# Patient Record
Sex: FEMALE | Race: WHITE | NOT HISPANIC OR LATINO | Employment: FULL TIME | URBAN - METROPOLITAN AREA
[De-identification: names, ages, dates, MRNs, and addresses within clinical notes are randomized per-mention and may not be internally consistent; named-entity substitution may affect disease eponyms.]

---

## 2020-02-27 ENCOUNTER — ULTRASOUND (OUTPATIENT)
Dept: OBGYN CLINIC | Facility: CLINIC | Age: 30
End: 2020-02-27
Payer: COMMERCIAL

## 2020-02-27 VITALS
SYSTOLIC BLOOD PRESSURE: 102 MMHG | BODY MASS INDEX: 34.45 KG/M2 | HEIGHT: 64 IN | WEIGHT: 201.8 LBS | DIASTOLIC BLOOD PRESSURE: 74 MMHG

## 2020-02-27 DIAGNOSIS — N91.2 AMENORRHEA: ICD-10-CM

## 2020-02-27 DIAGNOSIS — Z32.01 POSITIVE PREGNANCY TEST: ICD-10-CM

## 2020-02-27 LAB — SL AMB POCT URINE HCG: POSITIVE

## 2020-02-27 PROCEDURE — 76817 TRANSVAGINAL US OBSTETRIC: CPT | Performed by: NURSE PRACTITIONER

## 2020-02-27 PROCEDURE — 81025 URINE PREGNANCY TEST: CPT | Performed by: NURSE PRACTITIONER

## 2020-02-27 NOTE — PROGRESS NOTES
Technician: Study performed by the interpreting Certified Nurse Practitioner    Indications:  amenorrhea       Prior to use, disinfection was performed with [employer] Disinfection Process  Probe Serial Number#                                          [id number] [facility])      LMP 2019   (8 weeks 2 days gestational age based on dates)             Procedure Details  A gestational sac is seen containing a yolk sac and a sarah embryo  The embryonic crown-rump length measures  1 70 cm  and calculates to an estimated gestational age of 11 weeks and 1 Days  Embryonic cardiac activity is present @ 140 BPM     Findings:   Viable, sarah intrauterine pregnancy at 8 weeks,  2 days, (no change to MARCIAL)  with a calculated MARCIAL of 10/6/2020     Plan to RTO for OB Intake

## 2020-02-27 NOTE — PATIENT INSTRUCTIONS
First Trimester Pregnancy   WHAT YOU NEED TO KNOW:   The first trimester of pregnancy lasts from your last period through the 13th week of pregnancy  Follow up with your healthcare provider for prenatal care  Regular prenatal care can help to keep you and your baby healthy  DISCHARGE INSTRUCTIONS:   Return to the emergency department if:   · You have pain or cramping in your abdomen or low back  · You have severe vaginal bleeding or clotting  Contact your healthcare provider or obstetrician if:   · You have light bleeding  · You have chills or a fever  · You have vaginal itching, burning, or pain  · You have yellow, green, white, or foul-smelling vaginal discharge  · You have pain or burning when you urinate, less urine than usual, or pink or bloody urine  · You have questions or concerns about your condition or care  Follow up with your healthcare provider or obstetrician as directed:  Go to all of your prenatal visits during your pregnancy  Write down your questions so you remember to ask them during your visits  Stay healthy during pregnancy:   · Take prenatal vitamins as directed  Prenatal vitamins can help you get the amount of vitamins and minerals you need during pregnancy  Prenatal vitamins may also decrease the risk of certain birth defects  · Eat a variety of healthy foods  Healthy foods include fruits, vegetables, whole-grain breads, low-fat dairy products, beans, turkey and chicken, and lean red meat  Ask your healthcare provider for more information about foods that are healthy and safe to eat during pregnancy  · Drink liquids as directed  Ask how much liquid to drink each day and which liquids are best for you  Some healthy liquids include milk, water, and juice  It is not clear how caffeine affects pregnancy  Limit your intake of caffeine to less than 200 mg each day to avoid possible health problems   Caffeine may be found in coffee, tea, cola, sports drinks, and chocolate  Do not drink alcohol  · Talk to your healthcare provider before you take medicines  Many medicines can harm your baby, especially during early pregnancy  Ask your healthcare provider before you take any medicines, including over-the-counter medicines, vitamins, herbs, or food supplements  Never use illegal or street drugs while you are pregnant  Talk to your healthcare provider if you are having trouble quitting street drugs  · Exercise  Ask your healthcare provider about the best exercise plan for you  Exercise may help you feel better and make your labor and delivery easier  · Do not smoke  Smoking can cause problems during pregnancy  It can also cause your baby to weigh less at birth  If you smoke, it is never too late to quit  Ask for information if you need help quitting  Safety tips:   · Do not use a hot tub or sauna  while you are pregnant, especially during your first trimester  Hot tubs and saunas may raise your baby's temperature and increase the risk of birth defects  It also increases your risk of miscarriage  · Protect yourself from illness  Toxoplasmosis is a disease that can cause birth defects  To protect yourself from this disease, do not clean your cat's litter box yourself  Ask someone else to clean your cat's litter box while you are pregnant  Also, do not  eat raw meat or unwashed fruits and vegetables  Wash your hands after you touch raw meat, and eat only well-cooked meat  Wash fruits and vegetables well before you eat them  © 2017 [address] Information is for End User's use only and may not be sold, redistributed or otherwise used for commercial purposes  All illustrations and images included in CareNotes® are the copyrighted property of [employer] M [employer]  or [employer]onel  The above information is an  only  It is not intended as medical advice for individual conditions or treatments   Talk to your doctor, nurse or pharmacist before following any medical regimen to see if it is safe and effective for you

## 2020-03-12 ENCOUNTER — INITIAL PRENATAL (OUTPATIENT)
Dept: OBGYN CLINIC | Facility: CLINIC | Age: 30
End: 2020-03-12

## 2020-03-12 VITALS
WEIGHT: 200 LBS | SYSTOLIC BLOOD PRESSURE: 106 MMHG | HEART RATE: 82 BPM | BODY MASS INDEX: 34.15 KG/M2 | HEIGHT: 64 IN | DIASTOLIC BLOOD PRESSURE: 70 MMHG

## 2020-03-12 DIAGNOSIS — Z34.01 ENCOUNTER FOR SUPERVISION OF NORMAL FIRST PREGNANCY IN FIRST TRIMESTER: Primary | ICD-10-CM

## 2020-03-12 PROCEDURE — OBC: Performed by: STUDENT IN AN ORGANIZED HEALTH CARE EDUCATION/TRAINING PROGRAM

## 2020-03-12 RX ORDER — METOCLOPRAMIDE 10 MG/1
10 TABLET ORAL 4 TIMES DAILY
COMMUNITY
Start: 2020-03-10 | End: 2020-03-15

## 2020-03-12 NOTE — PROGRESS NOTES
OB INTAKE INTERVIEW  * Pt presents for OB intake 10w2d  * Accompanied by: self  *  *Hx of  delivery prior to 36 weeks 6 days no  *Last Menstrual Period: Pt's LMP was 19  *Ultrasound date:20   8weeks 1days  *Estimated date of delivery: 10/6/20   * confirmed by LMP    *Signs/Symptoms of Pregnancy   *nausea most day, vomiting some days, fatigue    *constipation no   *headaches no   *cramping/spotting no   *PICA cravings no  *Diabetes- if you answer yes, please order 1 hour GTT, 50g   *hx of GDM no   *BMI >35 no   *first degree relative with type 2 diabetes no   *hx of PCOS no   *current metformin use no   *prior hx of LGA/macrosomia no   *AMA with other risk factors no  *Hypertension- if you answer yes, please order preeclampsia labs including 24 hour urine protein   *Hx of chronic HTN no   *hx of gestational HTN no   *hx of preeclampsia, eclampsia, or HELLP syndrome no  *Infection Screening-    *does the pt have a hx of MRSA? no   *if yes- please follow MRSA protocol and obtain a nasal swab for MRSA culture   *history of herpes? no   *ok for blood transfusion YES  *Immunizations:   *influenza vaccine given today no-declines    *discussed Tdap vaccine-yes     *Interview education   *[facility] Pregnancy Essentials Book reviewed and discussed   *Handouts given:    *Baby and Me phone melvin guide    *Baby and Me support center    *  [facility]     *discussed genetic testing- pt interested YES     *appointment at [place] [id number] made YES    *Prenatal lab work scripts advised to complete by 3/21   *Nurse/Family Partnership- pt may qualify YES; referral placed no, info given and she understands that she must enroll by 28wk  *4 P's- substance abuse screening    Presently using? no    Past use? no    Partner using? no    Parents/Family using? no    *I have these concerns about this prenatal patient: Unplanned but welcomed pregnancy  This will be her and her FOB first child   Overall she feels well, nausea/acid reflux are her concerns  She has a known hiatal hernia, we discussed daily medication options  We also discussed healthy food/fluid options and weight gain during pregnancy with a starting BMI of 34 4  *Details that I feel the provider should be aware of:     PN1 visit scheduled  The patient was oriented to our practice and all questions were answered   PETER Marcelo by: Jami White RN

## 2020-03-12 NOTE — PATIENT INSTRUCTIONS
Pregnancy   AMBULATORY CARE:   What you need to know about pregnancy:  A normal pregnancy lasts about 40 weeks  The first trimester lasts from your last period through the 12th week of pregnancy  The second trimester lasts from the 13th week of your pregnancy through the 23rd week  The third trimester lasts from your 24th week of pregnancy until your baby is born  If you know the date of your last period, your healthcare provider can estimate your due date  You may give birth to your baby any time from 37 weeks to 2 weeks after your due date  Seek care immediately if:   · You develop a severe headache that does not go away  · You have new or increased vision changes, such as blurred or spotted vision  · You have new or increased swelling in your face or hands  · You have pain or cramping in your abdomen or low back  · You have vaginal bleeding  Contact your healthcare provider or obstetrician if:   · You have abdominal cramps, pressure, or tightening  · You have a change in vaginal discharge  · You cannot keep food or drinks down, and you are losing weight  · You have chills or a fever  · You have vaginal itching, burning, or pain  · You have yellow, green, white, or foul-smelling vaginal discharge  · You have pain or burning when you urinate, less urine than usual, or pink or bloody urine  · You have questions or concerns about your condition or care  Body changes that may occur during your pregnancy:   · Breast changes  you will experience include tenderness and tingling during the early part of your pregnancy  Your breasts will become larger  You may need to use a support bra  You may see a thin, yellow fluid, called colostrum, leak from your nipples during the second trimester  Colostrum is a liquid that changes to milk about 3 days after you give birth  · Skin changes and stretch marks  may occur during your pregnancy   You may have red marks, called stretch marks, on your skin  Stretch marks will usually fade after pregnancy  Use lotion if your skin is dry and itchy  The skin on your face, around your nipples, and below your belly button may darken  Most of the time, your skin will return to its normal color after your baby is born  · Morning sickness  is nausea and vomiting that can happen at any time of day  Avoid fatty and spicy foods  Eat small meals throughout the day instead of large meals  Otilia may help to decrease nausea  Ask your healthcare provider about other ways of decreasing nausea and vomiting  · Heartburn  may be caused by changes in your hormones during pregnancy  Your growing uterus may also push your stomach upward and force stomach acid to back up into your esophagus  Eat 4 or 5 small meals each day instead of large meals  Avoid spicy foods  Avoid eating right before bedtime  · Constipation  may develop during your pregnancy  To treat constipation, eat foods high in fiber such as fiber cereals, beans, fruits, vegetables, whole-grain breads, and prune juice  Get regular exercise and drink plenty of water  Your healthcare provider may also suggest a fiber supplement to soften your bowel movements  Talk to your healthcare provider before you use any medicines to decrease constipation  · Hemorrhoids  are enlarged veins in the rectal area  They may cause pain, itching, and bright red bleeding from your rectum  To decrease your risk of hemorrhoids, prevent constipation and do not strain to have a bowel movement  If you have hemorrhoids, soak in a tub of warm water to ease discomfort  Ask your healthcare provider how you can treat hemorrhoids  · Leg cramps and swelling  may be caused by low calcium levels or the added weight of pregnancy  Raise your legs above the level of your heart to decrease swelling  During a leg cramp, stretch or massage the muscle that has the cramp  Heat may help decrease pain and muscle spasms   Apply heat on your muscle for 20 to 30 minutes every 2 hours for as many days as directed  · Back pain  may occur as your baby grows  Do not stand for long periods of time or lift heavy items  Use good posture while you stand, squat, or bend  Wear low-heeled shoes with good support  Rest may also help to relieve back pain  Ask your healthcare provider about exercises you can do to strengthen your back muscles  Stay healthy during your pregnancy:   · Eat a variety of healthy foods  Healthy foods include fruits, vegetables, whole-grain breads, low-fat dairy foods, beans, lean meats, and fish  Drink liquids as directed  Ask how much liquid to drink each day and which liquids are best for you  Limit caffeine to less than 200 milligrams each day  Limit your intake of fish to 2 servings each week  Choose fish low in mercury such as canned light tuna, shrimp, crab, salmon, cod, or tilapia  Do not  eat fish high in mercury such as swordfish, tilefish, richie mackerel, and shark  · Take prenatal vitamins as directed  Your need for certain vitamins and minerals, such as folic acid, increases during pregnancy  Prenatal vitamins provide some of the extra vitamins and minerals you need  Prenatal vitamins may also help to decrease the risk of certain birth defects  · Ask how much weight you should gain during your pregnancy  Too much or too little weight gain can be unhealthy for you and your baby  · Talk to your healthcare provider about exercise  Moderate exercise can help you stay fit  Your healthcare provider will help you plan an exercise program that is safe for you during pregnancy  · Do not smoke  If you smoke, it is never too late to quit  Smoking increases your risk of a miscarriage and other health problems during your pregnancy  Smoking can cause your baby to be born too early or weigh less at birth  Ask your healthcare provider for information if you need help quitting  · Do not drink alcohol    Alcohol passes from your body to your baby through the placenta  It can affect your baby's brain development and cause fetal alcohol syndrome (FAS)  FAS is a group of conditions that causes mental, behavior, and growth problems  · Talk to your healthcare provider before you take any medicines  Many medicines may harm your baby if you take them when you are pregnant  Do not take any medicines, vitamins, herbs, or supplements without first talking to your healthcare provider  Never use illegal or street drugs (such as marijuana or cocaine) while you are pregnant  Safety tips:   · Avoid hot tubs and saunas  Do not use a hot tub or sauna while you are pregnant, especially during your first trimester  Hot tubs and saunas may raise your baby's temperature and increase the risk of birth defects  · Avoid toxoplasmosis  This is an infection caused by eating raw meat or being around infected cat feces  It can cause birth defects, miscarriages, and other problems  Wash your hands after you touch raw meat  Make sure any meat is well-cooked before you eat it  Avoid raw eggs and unpasteurized milk  Use gloves or ask someone else to clean your cat's litter box while you are pregnant  · Ask your healthcare provider about travel  The most comfortable time to travel is during the second trimester  Ask your healthcare provider if you can travel after 36 weeks  You may not be able to travel in an airplane after 36 weeks  He may also recommend that you avoid long road trips  Follow up with your healthcare provider or obstetrician as directed:  Go to all of your prenatal visits during your pregnancy  Write down your questions so you remember to ask them during your visits  © 2017 [employer] [employer] Information is for End User's use only and may not be sold, redistributed or otherwise used for commercial purposes   All illustrations and images included in CareNotes® are the copyrighted property of [employer] M [employer] Inc  or [employer]  The above information is an  only  It is not intended as medical advice for individual conditions or treatments  Talk to your doctor, nurse or pharmacist before following any medical regimen to see if it is safe and effective for you

## 2020-03-19 ENCOUNTER — APPOINTMENT (OUTPATIENT)
Dept: LAB | Facility: HOSPITAL | Age: 30
End: 2020-03-19
Attending: STUDENT IN AN ORGANIZED HEALTH CARE EDUCATION/TRAINING PROGRAM
Payer: COMMERCIAL

## 2020-03-19 ENCOUNTER — TRANSCRIBE ORDERS (OUTPATIENT)
Dept: ADMINISTRATIVE | Facility: HOSPITAL | Age: 30
End: 2020-03-19

## 2020-03-19 DIAGNOSIS — Z34.01 ENCOUNTER FOR SUPERVISION OF NORMAL FIRST PREGNANCY IN FIRST TRIMESTER: ICD-10-CM

## 2020-03-19 DIAGNOSIS — Z34.00 PRIMIGRAVIDA, ANTEPARTUM: ICD-10-CM

## 2020-03-19 DIAGNOSIS — Z34.00 PRIMIGRAVIDA, ANTEPARTUM: Primary | ICD-10-CM

## 2020-03-19 LAB
ABO GROUP BLD: NORMAL
BACTERIA UR QL AUTO: ABNORMAL /HPF
BASOPHILS # BLD AUTO: 0.03 THOUSANDS/ΜL (ref 0–0.1)
BASOPHILS NFR BLD AUTO: 0 % (ref 0–1)
BILIRUB UR QL STRIP: NEGATIVE
BLD GP AB SCN SERPL QL: NEGATIVE
CLARITY UR: ABNORMAL
COLOR UR: YELLOW
EOSINOPHIL # BLD AUTO: 0.07 THOUSAND/ΜL (ref 0–0.61)
EOSINOPHIL NFR BLD AUTO: 1 % (ref 0–6)
ERYTHROCYTE [DISTWIDTH] IN BLOOD BY AUTOMATED COUNT: 13.9 % (ref 11.6–15.1)
GLUCOSE UR STRIP-MCNC: NEGATIVE MG/DL
HBV SURFACE AG SER QL: NORMAL
HCT VFR BLD AUTO: 40.7 % (ref 34.8–46.1)
HGB BLD-MCNC: 13.2 G/DL (ref 11.5–15.4)
HGB UR QL STRIP.AUTO: NEGATIVE
IMM GRANULOCYTES # BLD AUTO: 0.04 THOUSAND/UL (ref 0–0.2)
IMM GRANULOCYTES NFR BLD AUTO: 0 % (ref 0–2)
KETONES UR STRIP-MCNC: NEGATIVE MG/DL
LEUKOCYTE ESTERASE UR QL STRIP: ABNORMAL
LYMPHOCYTES # BLD AUTO: 2.71 THOUSANDS/ΜL (ref 0.6–4.47)
LYMPHOCYTES NFR BLD AUTO: 28 % (ref 14–44)
MCH RBC QN AUTO: 27.9 PG (ref 26.8–34.3)
MCHC RBC AUTO-ENTMCNC: 32.4 G/DL (ref 31.4–37.4)
MCV RBC AUTO: 86 FL (ref 82–98)
MONOCYTES # BLD AUTO: 0.82 THOUSAND/ΜL (ref 0.17–1.22)
MONOCYTES NFR BLD AUTO: 9 % (ref 4–12)
NEUTROPHILS # BLD AUTO: 5.97 THOUSANDS/ΜL (ref 1.85–7.62)
NEUTS SEG NFR BLD AUTO: 62 % (ref 43–75)
NITRITE UR QL STRIP: NEGATIVE
NON-SQ EPI CELLS URNS QL MICRO: ABNORMAL /HPF
NRBC BLD AUTO-RTO: 0 /100 WBCS
PH UR STRIP.AUTO: 7.5 [PH]
PLATELET # BLD AUTO: 284 THOUSANDS/UL (ref 149–390)
PMV BLD AUTO: 10.5 FL (ref 8.9–12.7)
PROT UR STRIP-MCNC: NEGATIVE MG/DL
RBC # BLD AUTO: 4.73 MILLION/UL (ref 3.81–5.12)
RBC #/AREA URNS AUTO: ABNORMAL /HPF
RH BLD: POSITIVE
RUBV IGG SERPL IA-ACNC: 141.9 IU/ML
SP GR UR STRIP.AUTO: 1.01 (ref 1–1.03)
SPECIMEN EXPIRATION DATE: NORMAL
UROBILINOGEN UR QL STRIP.AUTO: 0.2 E.U./DL
WBC # BLD AUTO: 9.64 THOUSAND/UL (ref 4.31–10.16)
WBC #/AREA URNS AUTO: ABNORMAL /HPF

## 2020-03-19 PROCEDURE — 81001 URINALYSIS AUTO W/SCOPE: CPT

## 2020-03-19 PROCEDURE — 87086 URINE CULTURE/COLONY COUNT: CPT

## 2020-03-19 PROCEDURE — 36415 COLL VENOUS BLD VENIPUNCTURE: CPT

## 2020-03-19 PROCEDURE — 80081 OBSTETRIC PANEL INC HIV TSTG: CPT

## 2020-03-20 LAB
BACTERIA UR CULT: NORMAL
HIV 1+2 AB+HIV1 P24 AG SERPL QL IA: NORMAL
RPR SER QL: NORMAL

## 2020-03-26 ENCOUNTER — INITIAL PRENATAL (OUTPATIENT)
Dept: OBGYN CLINIC | Facility: CLINIC | Age: 30
End: 2020-03-26

## 2020-03-26 VITALS
HEIGHT: 65 IN | SYSTOLIC BLOOD PRESSURE: 120 MMHG | WEIGHT: 197.8 LBS | RESPIRATION RATE: 14 BRPM | BODY MASS INDEX: 32.96 KG/M2 | DIASTOLIC BLOOD PRESSURE: 68 MMHG

## 2020-03-26 DIAGNOSIS — Z34.01 ENCOUNTER FOR SUPERVISION OF NORMAL FIRST PREGNANCY IN FIRST TRIMESTER: Primary | ICD-10-CM

## 2020-03-26 DIAGNOSIS — Z11.3 SCREENING FOR STDS (SEXUALLY TRANSMITTED DISEASES): ICD-10-CM

## 2020-03-26 LAB
SL AMB  POCT GLUCOSE, UA: NEGATIVE
SL AMB POCT URINE PROTEIN: NEGATIVE

## 2020-03-26 PROCEDURE — 87491 CHLMYD TRACH DNA AMP PROBE: CPT | Performed by: STUDENT IN AN ORGANIZED HEALTH CARE EDUCATION/TRAINING PROGRAM

## 2020-03-26 PROCEDURE — G0145 SCR C/V CYTO,THINLAYER,RESCR: HCPCS | Performed by: STUDENT IN AN ORGANIZED HEALTH CARE EDUCATION/TRAINING PROGRAM

## 2020-03-26 PROCEDURE — 87591 N.GONORRHOEAE DNA AMP PROB: CPT | Performed by: STUDENT IN AN ORGANIZED HEALTH CARE EDUCATION/TRAINING PROGRAM

## 2020-03-26 PROCEDURE — PNV: Performed by: NURSE PRACTITIONER

## 2020-03-26 NOTE — PATIENT INSTRUCTIONS
Pregnancy at [address] to [address]:   You are now at the end of your first trimester and entering your second trimester  Morning sickness usually goes away by this time  You may have other symptoms such as fatigue, frequent urination, and headaches  You may have gained between 2 to 4 pounds by now  DISCHARGE INSTRUCTIONS:   Return to the emergency department if:   · You have pain or cramping in your abdomen or low back  · You have heavy vaginal bleeding or clotting  · You pass material that looks like tissue or large clots  Collect the material and bring it with you  Contact your healthcare provider if:   · You cannot keep food or drinks down, and you are losing weight  · You have light bleeding  · You have chills or a fever  · You have vaginal itching, burning, or pain  · You have yellow, green, white, or foul-smelling vaginal discharge  · You have pain or burning when you urinate, less urine than usual, or pink or bloody urine  · You have questions or concerns about your condition or care  How to care for yourself at this stage of your pregnancy:   · Get plenty of rest   You may feel more tired than normal  You may need to take naps or go to bed earlier  · Manage nausea and vomiting  Avoid fatty and spicy foods  Eat small meals throughout the day instead of large meals  Otilia may help to decrease nausea  Ask your healthcare provider about other ways of decreasing nausea and vomiting  · Eat a variety of healthy foods  Healthy foods include fruits, vegetables, whole-grain breads, low-fat dairy foods, beans, lean meats, and fish  Drink liquids as directed  Ask how much liquid to drink each day and which liquids are best for you  Limit caffeine to less than 200 milligrams each day  Limit your intake of fish to 2 servings each week  Choose fish low in mercury such as canned light tuna, shrimp, salmon, cod, or tilapia   Do not  eat fish high in mercury such as swordfish, tilefish, richie mackerel, and shark  · Take prenatal vitamins as directed  Your need for certain vitamins and minerals, such as folic acid, increases during pregnancy  Prenatal vitamins provide some of the extra vitamins and minerals you need  Prenatal vitamins may also help to decrease the risk of certain birth defects  · Do not smoke  If you smoke, it is never too late to quit  Smoking increases your risk of a miscarriage and other health problems during your pregnancy  Smoking can cause your baby to be born too early or weigh less at birth  Ask your healthcare provider for information if you need help quitting  · Do not drink alcohol  Alcohol passes from your body to your baby through the placenta  It can affect your baby's brain development and cause fetal alcohol syndrome (FAS)  FAS is a group of conditions that causes mental, behavior, and growth problems  · Talk to your healthcare provider before you take any medicines  Many medicines may harm your baby if you take them when you are pregnant  Do not take any medicines, vitamins, herbs, or supplements without first talking to your healthcare provider  Never use illegal or street drugs (such as marijuana or cocaine) while you are pregnant  Safety tips during pregnancy:   · Avoid hot tubs and saunas  Do not use a hot tub or sauna while you are pregnant, especially during your first trimester  Hot tubs and saunas may raise your baby's temperature and increase the risk of birth defects  · Avoid toxoplasmosis  This is an infection caused by eating raw meat or being around infected cat feces  It can cause birth defects, miscarriages, and other problems  Wash your hands after you touch raw meat  Make sure any meat is well-cooked before you eat it  Avoid raw eggs and unpasteurized milk  Use gloves or ask someone else to clean your cat's litter box while you are pregnant  Changes that are happening with your baby:   Your baby has fully formed fingernails and toenails  Your baby's heartbeat can now be heard  Ask your healthcare provider if you can listen to your baby's heartbeat  By week 14, your baby is over 4 inches long from the top of the head to the rump (baby's bottom)  Your baby weighs over 3 ounces  What you need to know about prenatal care:  During the first 28 weeks of your pregnancy, you will see your healthcare provider once a month  Prenatal care can help prevent problems during pregnancy and childbirth  Your healthcare provider will check your blood pressure and weight  You may also need any of the following:  · A urine test  may also be done to check for sugar and protein  These can be signs of gestational diabetes or infection  · Genetic disorders screening tests  may be offered to you  This screening test checks your baby's risk of genetic disorders such as Down syndrome  The screening test includes a blood test and ultrasound  · Your baby's heart rate  will be checked  © 2017 [address] Information is for End User's use only and may not be sold, redistributed or otherwise used for commercial purposes  All illustrations and images included in CareNotes® are the copyrighted property of [employer]  or [employer] Coronel  The above information is an  only  It is not intended as medical advice for individual conditions or treatments  Talk to your doctor, nurse or pharmacist before following any medical regimen to see if it is safe and effective for you

## 2020-03-26 NOTE — PROGRESS NOTES
Problem List Items Addressed This Visit        Other    Encounter for supervision of normal first pregnancy in first trimester - Primary    Relevant Orders    POCT urine dip    Liquid-based pap, screening    Chlamydia/GC amplified DNA by PCR      Other Visit Diagnoses     Screening for STDs (sexually transmitted diseases)        Relevant Orders    Chlamydia/GC amplified DNA by PCR        Here for 1st OB appointment  Feels well other than occasional nausea/vomitting  Pap/GC/CT done today  Has appointment for genetic screening discussion on 3/30  Denies LOF/CTX/VB  No other pregnancy concerns expressed by patient  Reviewed ante labs  All questions answered  Oriented to our practice   Advised Social Distancing and frequent handwashing, limited work interactions etc

## 2020-03-30 ENCOUNTER — TELEPHONE (OUTPATIENT)
Dept: PERINATAL CARE | Facility: OTHER | Age: 30
End: 2020-03-30

## 2020-03-31 ENCOUNTER — ROUTINE PRENATAL (OUTPATIENT)
Dept: PERINATAL CARE | Facility: OTHER | Age: 30
End: 2020-03-31
Payer: COMMERCIAL

## 2020-03-31 ENCOUNTER — TELEPHONE (OUTPATIENT)
Dept: PERINATAL CARE | Facility: OTHER | Age: 30
End: 2020-03-31

## 2020-03-31 VITALS — HEIGHT: 64 IN | WEIGHT: 201.4 LBS | BODY MASS INDEX: 34.38 KG/M2

## 2020-03-31 DIAGNOSIS — Z32.01 POSITIVE PREGNANCY TEST: ICD-10-CM

## 2020-03-31 DIAGNOSIS — Z36.82 ENCOUNTER FOR NUCHAL TRANSLUCENCY TESTING: Primary | ICD-10-CM

## 2020-03-31 DIAGNOSIS — Z34.01 ENCOUNTER FOR SUPERVISION OF NORMAL FIRST PREGNANCY IN FIRST TRIMESTER: ICD-10-CM

## 2020-03-31 DIAGNOSIS — Z3A.13 13 WEEKS GESTATION OF PREGNANCY: ICD-10-CM

## 2020-03-31 LAB
C TRACH DNA SPEC QL NAA+PROBE: NEGATIVE
LAB AP GYN PRIMARY INTERPRETATION: NORMAL
Lab: NORMAL
N GONORRHOEA DNA SPEC QL NAA+PROBE: NEGATIVE
PATH INTERP SPEC-IMP: NORMAL

## 2020-03-31 PROCEDURE — 76813 OB US NUCHAL MEAS 1 GEST: CPT | Performed by: OBSTETRICS & GYNECOLOGY

## 2020-03-31 PROCEDURE — 99241 PR OFFICE CONSULTATION NEW/ESTAB PATIENT 15 MIN: CPT | Performed by: OBSTETRICS & GYNECOLOGY

## 2020-03-31 RX ORDER — ASPIRIN 81 MG/1
162 TABLET ORAL DAILY
Qty: 180 TABLET | Refills: 3 | Status: SHIPPED | OUTPATIENT
Start: 2020-03-31 | End: 2020-07-31 | Stop reason: ALTCHOICE

## 2020-03-31 NOTE — LETTER
March 31, 2020     Edu Vega, Joselin Maierica [address]  [facility]  [address] [address]    Patient: Fabricio Shah   YOB: 1990   Date of Visit: 3/31/2020       Dear Dr Ann Lange: Thank you for referring Fabricio Come to me for evaluation  Below are my notes for this consultation  If you have questions, please do not hesitate to call me  I look forward to following your patient along with you  Sincerely,        Jc Hooks MD        CC: No Recipients  Jc Hooks MD  3/31/2020  8:52 AM  Sign at close encounter  [address] [facility] [address], Joselin Maier[address]  [facility]  [place], [address]     Thank you for referring your Fabricio Come for a Maternal-Fetal Medicine Consultation:  Below is my consultation  Thank you very much for requesting a consultation on this very nice patient for the indication genetic screening  This is the patient's 1st pregnancy  The patient has a history of a cholecystectomy in the past   She has no other significant medical history other than a BMI consistent with class 1 obesity  She denies the current use of tobacco, alcohol, or drugs  She currently takes vitamins and has a sensitivity to oxycodone  Her family medical history is significant for the father of the baby with sickle cell trait and the patient's mother has hypertension  Upon review of her labs, she did not have a hemoglobin electrophoresis  I ordered 1 for her  We discussed the options for genetic screening, including but not limited to first trimester screening, second trimester screening, combined first and second trimester screening, noninvasive prenatal testing (NIPT) for patients at high risk and diagnostic screening through the use of CVS and amniocentesis    We discussed the risks and benefits of each approach including the sensitivities and false positive rates as well as the difference between a screening test and a diagnostic test   At the conclusion of our discussion the patient elected Stepwise Sequential Screening to delineate her risk for fetal aneuploidy  She was given a requisition to go to [facility] to have the first trimester blood work drawn  The first trimester portion of the screening results, encompassing age, nuchal translucency, and biochemistry should be available within one week of testing and will be reported from [facility]   The second stage of sequential screening should be completed between the 15th and 21st week of pregnancy (ideally between 16-18 weeks)  Given the patient's history of nulliparity, ethnicity, and strong family history of hypertension, I recommend initiating low dose aspirin therapy  A recent meta-analysis yielded risk reductions of 24% for preeclampsia, 20% for intrauterine growth restriction, and 14% for  birth, with an absolute risk reduction of 2-5% for preeclampsia, one to 5% for intrauterine growth restriction, and 2-4% for  birth  In this study, there was no identified risk of harm to the mother or fetus but long-term evidence was somewhat limited  Given the overall safety profile and risk-benefit analysis, I recommend 162 mg of aspirin be taken Daily until delivery  I reviewed these recommendations with the patient and answered all of her questions to apparent satisfaction  We discussed follow-up in detail and I recommend an anatomy ultrasound be scheduled for 20 weeks gestation  Thank you very much for allowing us to participate in the care of this very nice patient  Should you have any questions, please do not hesitate to contact our office  Please note, in addition to the time spent discussing the results of the ultrasound, I spent approximately 15 minutes of face-to-face time with the patient, greater than 50% of which was spent in counseling and the coordination of care for this patient  Portions of the record may have been created with voice recognition software    Occasional wrong word or "sound a like" substitutions may have occurred due to the inherent limitations of voice recognition software  Read the chart carefully and recognize, using context, where substitutions have occurred  Pio Gallegos MD  Attending Physician, [place]

## 2020-03-31 NOTE — PROGRESS NOTES
CONSULT NOTE    MD Katarina Pope 621  1000 [facility], [address]     Thank you for referring your Catrachita Sommers for a Maternal-Fetal Medicine Consultation:  Below is my consultation  Thank you very much for requesting a consultation on this very nice patient for the indication genetic screening  This is the patient's 1st pregnancy  The patient has a history of a cholecystectomy in the past   She has no other significant medical history other than a BMI consistent with class 1 obesity  She denies the current use of tobacco, alcohol, or drugs  She currently takes vitamins and has a sensitivity to oxycodone  Her family medical history is significant for the father of the baby with sickle cell trait and the patient's mother has hypertension  Upon review of her labs, she did not have a hemoglobin electrophoresis  I ordered 1 for her  We discussed the options for genetic screening, including but not limited to first trimester screening, second trimester screening, combined first and second trimester screening, noninvasive prenatal testing (NIPT) for patients at high risk and diagnostic screening through the use of CVS and amniocentesis  We discussed the risks and benefits of each approach including the sensitivities and false positive rates as well as the difference between a screening test and a diagnostic test   At the conclusion of our discussion the patient elected Stepwise Sequential Screening to delineate her risk for fetal aneuploidy  She was given a requisition to go to [facility] to have the first trimester blood work drawn  The first trimester portion of the screening results, encompassing age, nuchal translucency, and biochemistry should be available within one week of testing and will be reported from [facility]   The second stage of sequential screening should be completed between the 15th and 21st week of pregnancy (ideally between 16-18 weeks)        Given the patient's history of nulliparity, ethnicity, and strong family history of hypertension, I recommend initiating low dose aspirin therapy  A recent meta-analysis yielded risk reductions of 24% for preeclampsia, 20% for intrauterine growth restriction, and 14% for  birth, with an absolute risk reduction of 2-5% for preeclampsia, one to 5% for intrauterine growth restriction, and 2-4% for  birth  In this study, there was no identified risk of harm to the mother or fetus but long-term evidence was somewhat limited  Given the overall safety profile and risk-benefit analysis, I recommend 162 mg of aspirin be taken Daily until delivery  I reviewed these recommendations with the patient and answered all of her questions to apparent satisfaction  We discussed follow-up in detail and I recommend an anatomy ultrasound be scheduled for 20 weeks gestation  Thank you very much for allowing us to participate in the care of this very nice patient  Should you have any questions, please do not hesitate to contact our office  Please note, in addition to the time spent discussing the results of the ultrasound, I spent approximately 15 minutes of face-to-face time with the patient, greater than 50% of which was spent in counseling and the coordination of care for this patient  Portions of the record may have been created with voice recognition software  Occasional wrong word or "sound a like" substitutions may have occurred due to the inherent limitations of voice recognition software  Read the chart carefully and recognize, using context, where substitutions have occurred  Pio Omer MD  Attending Physician, [place]

## 2020-04-24 ENCOUNTER — TELEMEDICINE (OUTPATIENT)
Dept: OBGYN CLINIC | Facility: CLINIC | Age: 30
End: 2020-04-24

## 2020-04-24 DIAGNOSIS — Z34.01 ENCOUNTER FOR SUPERVISION OF NORMAL FIRST PREGNANCY IN FIRST TRIMESTER: Primary | ICD-10-CM

## 2020-04-24 PROCEDURE — PNV: Performed by: NURSE PRACTITIONER

## 2020-05-22 ENCOUNTER — TELEPHONE (OUTPATIENT)
Dept: PERINATAL CARE | Facility: OTHER | Age: 30
End: 2020-05-22

## 2020-05-25 PROBLEM — O99.212 MATERNAL OBESITY, ANTEPARTUM, SECOND TRIMESTER: Status: ACTIVE | Noted: 2020-05-25

## 2020-05-26 ENCOUNTER — ROUTINE PRENATAL (OUTPATIENT)
Dept: PERINATAL CARE | Facility: OTHER | Age: 30
End: 2020-05-26
Payer: COMMERCIAL

## 2020-05-26 VITALS
HEART RATE: 91 BPM | TEMPERATURE: 96.2 F | HEIGHT: 64 IN | SYSTOLIC BLOOD PRESSURE: 110 MMHG | DIASTOLIC BLOOD PRESSURE: 72 MMHG | WEIGHT: 204 LBS | BODY MASS INDEX: 34.83 KG/M2

## 2020-05-26 DIAGNOSIS — O99.212 MATERNAL OBESITY, ANTEPARTUM, SECOND TRIMESTER: Primary | ICD-10-CM

## 2020-05-26 DIAGNOSIS — Z36.86 ENCOUNTER FOR ANTENATAL SCREENING FOR CERVICAL LENGTH: ICD-10-CM

## 2020-05-26 DIAGNOSIS — Z3A.20 20 WEEKS GESTATION OF PREGNANCY: ICD-10-CM

## 2020-05-26 PROCEDURE — 76811 OB US DETAILED SNGL FETUS: CPT | Performed by: OBSTETRICS & GYNECOLOGY

## 2020-05-26 PROCEDURE — 99212 OFFICE O/P EST SF 10 MIN: CPT | Performed by: OBSTETRICS & GYNECOLOGY

## 2020-05-26 PROCEDURE — 76817 TRANSVAGINAL US OBSTETRIC: CPT | Performed by: OBSTETRICS & GYNECOLOGY

## 2020-06-04 ENCOUNTER — ROUTINE PRENATAL (OUTPATIENT)
Dept: OBGYN CLINIC | Facility: CLINIC | Age: 30
End: 2020-06-04

## 2020-06-04 DIAGNOSIS — Z34.01 ENCOUNTER FOR SUPERVISION OF NORMAL FIRST PREGNANCY IN FIRST TRIMESTER: Primary | ICD-10-CM

## 2020-06-04 DIAGNOSIS — O99.212 MATERNAL OBESITY, ANTEPARTUM, SECOND TRIMESTER: ICD-10-CM

## 2020-06-04 LAB
SL AMB  POCT GLUCOSE, UA: NEGATIVE
SL AMB POCT URINE PROTEIN: NEGATIVE

## 2020-06-04 PROCEDURE — PNV: Performed by: NURSE PRACTITIONER

## 2020-06-10 ENCOUNTER — TELEPHONE (OUTPATIENT)
Dept: OBGYN CLINIC | Facility: CLINIC | Age: 30
End: 2020-06-10

## 2020-06-10 DIAGNOSIS — M25.475 BILATERAL SWELLING OF FEET AND ANKLES: ICD-10-CM

## 2020-06-10 DIAGNOSIS — O12.02: Primary | ICD-10-CM

## 2020-06-10 DIAGNOSIS — M79.89 BILATERAL SWELLING OF FEET: ICD-10-CM

## 2020-06-10 DIAGNOSIS — M25.471 BILATERAL SWELLING OF FEET AND ANKLES: ICD-10-CM

## 2020-06-10 DIAGNOSIS — M25.472 BILATERAL SWELLING OF FEET AND ANKLES: ICD-10-CM

## 2020-06-10 DIAGNOSIS — Z34.01 ENCOUNTER FOR SUPERVISION OF NORMAL FIRST PREGNANCY IN FIRST TRIMESTER: Primary | ICD-10-CM

## 2020-06-10 DIAGNOSIS — M25.474 BILATERAL SWELLING OF FEET AND ANKLES: ICD-10-CM

## 2020-06-30 ENCOUNTER — ROUTINE PRENATAL (OUTPATIENT)
Dept: OBGYN CLINIC | Facility: CLINIC | Age: 30
End: 2020-06-30
Payer: COMMERCIAL

## 2020-06-30 VITALS — BODY MASS INDEX: 35.84 KG/M2 | WEIGHT: 208.8 LBS | DIASTOLIC BLOOD PRESSURE: 72 MMHG | SYSTOLIC BLOOD PRESSURE: 114 MMHG

## 2020-06-30 DIAGNOSIS — Z34.01 ENCOUNTER FOR SUPERVISION OF NORMAL FIRST PREGNANCY IN FIRST TRIMESTER: ICD-10-CM

## 2020-06-30 DIAGNOSIS — O99.212 MATERNAL OBESITY, ANTEPARTUM, SECOND TRIMESTER: ICD-10-CM

## 2020-06-30 DIAGNOSIS — Z34.92 PREGNANCY, OBSTETRICAL CARE, SECOND TRIMESTER: Primary | ICD-10-CM

## 2020-06-30 LAB
SL AMB  POCT GLUCOSE, UA: NORMAL
SL AMB POCT URINE PROTEIN: NORMAL

## 2020-06-30 PROCEDURE — 81002 URINALYSIS NONAUTO W/O SCOPE: CPT | Performed by: STUDENT IN AN ORGANIZED HEALTH CARE EDUCATION/TRAINING PROGRAM

## 2020-06-30 PROCEDURE — PNV: Performed by: STUDENT IN AN ORGANIZED HEALTH CARE EDUCATION/TRAINING PROGRAM

## 2020-07-13 ENCOUNTER — APPOINTMENT (OUTPATIENT)
Dept: LAB | Facility: HOSPITAL | Age: 30
End: 2020-07-13
Attending: STUDENT IN AN ORGANIZED HEALTH CARE EDUCATION/TRAINING PROGRAM
Payer: COMMERCIAL

## 2020-07-13 DIAGNOSIS — Z34.92 PREGNANCY, OBSTETRICAL CARE, SECOND TRIMESTER: ICD-10-CM

## 2020-07-13 LAB
ALBUMIN SERPL BCP-MCNC: 2.7 G/DL (ref 3.5–5)
ALP SERPL-CCNC: 78 U/L (ref 46–116)
ALT SERPL W P-5'-P-CCNC: 12 U/L (ref 12–78)
ANION GAP SERPL CALCULATED.3IONS-SCNC: 13 MMOL/L (ref 4–13)
AST SERPL W P-5'-P-CCNC: 15 U/L (ref 5–45)
BASOPHILS # BLD AUTO: 0.01 THOUSANDS/ΜL (ref 0–0.1)
BASOPHILS NFR BLD AUTO: 0 % (ref 0–1)
BILIRUB SERPL-MCNC: 0.3 MG/DL (ref 0.2–1)
BUN SERPL-MCNC: 2 MG/DL (ref 5–25)
CALCIUM SERPL-MCNC: 9 MG/DL (ref 8.3–10.1)
CHLORIDE SERPL-SCNC: 105 MMOL/L (ref 100–108)
CO2 SERPL-SCNC: 22 MMOL/L (ref 21–32)
CREAT SERPL-MCNC: 0.59 MG/DL (ref 0.6–1.3)
EOSINOPHIL # BLD AUTO: 0.06 THOUSAND/ΜL (ref 0–0.61)
EOSINOPHIL NFR BLD AUTO: 1 % (ref 0–6)
ERYTHROCYTE [DISTWIDTH] IN BLOOD BY AUTOMATED COUNT: 15.6 % (ref 11.6–15.1)
GFR SERPL CREATININE-BSD FRML MDRD: 124 ML/MIN/1.73SQ M
GLUCOSE 1H P 50 G GLC PO SERPL-MCNC: 127 MG/DL
GLUCOSE SERPL-MCNC: 128 MG/DL (ref 65–140)
HCT VFR BLD AUTO: 33 % (ref 34.8–46.1)
HGB BLD-MCNC: 10.5 G/DL (ref 11.5–15.4)
IMM GRANULOCYTES # BLD AUTO: 0.11 THOUSAND/UL (ref 0–0.2)
IMM GRANULOCYTES NFR BLD AUTO: 1 % (ref 0–2)
LYMPHOCYTES # BLD AUTO: 1.74 THOUSANDS/ΜL (ref 0.6–4.47)
LYMPHOCYTES NFR BLD AUTO: 20 % (ref 14–44)
MCH RBC QN AUTO: 28.2 PG (ref 26.8–34.3)
MCHC RBC AUTO-ENTMCNC: 31.8 G/DL (ref 31.4–37.4)
MCV RBC AUTO: 89 FL (ref 82–98)
MONOCYTES # BLD AUTO: 0.53 THOUSAND/ΜL (ref 0.17–1.22)
MONOCYTES NFR BLD AUTO: 6 % (ref 4–12)
NEUTROPHILS # BLD AUTO: 6.21 THOUSANDS/ΜL (ref 1.85–7.62)
NEUTS SEG NFR BLD AUTO: 72 % (ref 43–75)
NRBC BLD AUTO-RTO: 0 /100 WBCS
PLATELET # BLD AUTO: 240 THOUSANDS/UL (ref 149–390)
PMV BLD AUTO: 11.1 FL (ref 8.9–12.7)
POTASSIUM SERPL-SCNC: 3.3 MMOL/L (ref 3.5–5.3)
PROT SERPL-MCNC: 6.9 G/DL (ref 6.4–8.2)
RBC # BLD AUTO: 3.73 MILLION/UL (ref 3.81–5.12)
SODIUM SERPL-SCNC: 140 MMOL/L (ref 136–145)
WBC # BLD AUTO: 8.66 THOUSAND/UL (ref 4.31–10.16)

## 2020-07-13 PROCEDURE — 82950 GLUCOSE TEST: CPT

## 2020-07-13 PROCEDURE — 85025 COMPLETE CBC W/AUTO DIFF WBC: CPT

## 2020-07-13 PROCEDURE — 86592 SYPHILIS TEST NON-TREP QUAL: CPT

## 2020-07-13 PROCEDURE — 80053 COMPREHEN METABOLIC PANEL: CPT

## 2020-07-13 PROCEDURE — 36415 COLL VENOUS BLD VENIPUNCTURE: CPT

## 2020-07-14 ENCOUNTER — TELEPHONE (OUTPATIENT)
Dept: OBGYN CLINIC | Facility: CLINIC | Age: 30
End: 2020-07-14

## 2020-07-14 LAB — RPR SER QL: NORMAL

## 2020-07-14 NOTE — TELEPHONE ENCOUNTER
Please let patient know she passed sugar test  Electrolytes show low potassium- I recommend she try to increase intake of high potassium foods to help with leg cramps  Blood count shows mild anemia and I recommend daily iron supplement in addition to her prenatal vitamin  Thanks!

## 2020-07-14 NOTE — TELEPHONE ENCOUNTER
Pt contacted and advised as directed  I informed pt I will mail a list of iron rich foods to her home address on file for her and informed pt of a list of potassium rich foods to help with leg cramps  Pt was grateful for the call and the information

## 2020-07-17 ENCOUNTER — ROUTINE PRENATAL (OUTPATIENT)
Dept: OBGYN CLINIC | Facility: CLINIC | Age: 30
End: 2020-07-17
Payer: COMMERCIAL

## 2020-07-17 VITALS
TEMPERATURE: 98 F | BODY MASS INDEX: 35.91 KG/M2 | DIASTOLIC BLOOD PRESSURE: 72 MMHG | SYSTOLIC BLOOD PRESSURE: 118 MMHG | WEIGHT: 209.2 LBS

## 2020-07-17 DIAGNOSIS — O99.019 ANTEPARTUM ANEMIA: ICD-10-CM

## 2020-07-17 DIAGNOSIS — Z34.01 ENCOUNTER FOR SUPERVISION OF NORMAL FIRST PREGNANCY IN FIRST TRIMESTER: Primary | ICD-10-CM

## 2020-07-17 DIAGNOSIS — Z23 NEED FOR TDAP VACCINATION: ICD-10-CM

## 2020-07-17 LAB
SL AMB  POCT GLUCOSE, UA: NEGATIVE
SL AMB POCT URINE PROTEIN: NEGATIVE

## 2020-07-17 PROCEDURE — 90715 TDAP VACCINE 7 YRS/> IM: CPT

## 2020-07-17 PROCEDURE — PNV: Performed by: NURSE PRACTITIONER

## 2020-07-17 PROCEDURE — 90471 IMMUNIZATION ADMIN: CPT

## 2020-07-17 NOTE — ASSESSMENT & PLAN NOTE
Discussed with patient and the benefits, contraindications and side effects of the following vaccines:  TDAP  Discussed all components of the vaccine

## 2020-07-17 NOTE — ASSESSMENT & PLAN NOTE
Return OB  Denies LOF, vag blding, ctxs, cramping and reports good FM  Taking PNV/ASA/FE daily as prescribed  Denies any problems today  TDAP vaccine given today  Reviewed PTL precautions, [address] also reviewed

## 2020-07-17 NOTE — PATIENT INSTRUCTIONS
Pregnancy at 32 to 30 [address]:   What changes are happening in my body? You may notice new symptoms such as shortness of breath, heartburn, or swelling of your ankles and feet  You may also have trouble sleeping or contractions  How do I care for myself at this stage of my pregnancy? · Eat a variety of healthy foods  Healthy foods include fruits, vegetables, whole-grain breads, low-fat dairy foods, beans, lean meats, and fish  Drink liquids as directed  Ask how much liquid to drink each day and which liquids are best for you  Limit caffeine to less than 200 milligrams each day  Limit your intake of fish to 2 servings each week  Choose fish low in mercury such as canned light tuna, shrimp, salmon, cod, or tilapia  Do not  eat fish high in mercury such as swordfish, tilefish, richie mackerel, and shark  · Manage heartburn  by eating 4 or 5 small meals each day instead of large meals  Avoid spicy food  · Manage swelling  by lying down and putting your feet up  · Take prenatal vitamins as directed  Your need for certain vitamins and minerals, such as folic acid, increases during pregnancy  Prenatal vitamins provide some of the extra vitamins and minerals you need  Prenatal vitamins may also help to decrease the risk of certain birth defects  · Talk to your healthcare provider about exercise  Moderate exercise can help you stay fit  Your healthcare provider will help you plan an exercise program that is safe for you during pregnancy  · Do not smoke  If you smoke, it is never too late to quit  Smoking increases your risk of a miscarriage and other health problems during your pregnancy  Smoking can cause your baby to be born too early or weigh less at birth  Ask your healthcare provider for information if you need help quitting  · Do not drink alcohol  Alcohol passes from your body to your baby through the placenta   It can affect your baby's brain development and cause fetal alcohol syndrome (FAS)  FAS is a group of conditions that causes mental, behavior, and growth problems  · Talk to your healthcare provider before you take any medicines  Many medicines may harm your baby if you take them when you are pregnant  Do not take any medicines, vitamins, herbs, or supplements without first talking to your healthcare provider  Never use illegal or street drugs (such as marijuana or cocaine) while you are pregnant  What are some safety tips during pregnancy? · Avoid hot tubs and saunas  Do not use a hot tub or sauna while you are pregnant, especially during your first trimester  Hot tubs and saunas may raise your baby's temperature and increase the risk of birth defects  · Avoid toxoplasmosis  This is an infection caused by eating raw meat or being around infected cat feces  It can cause birth defects, miscarriages, and other problems  Wash your hands after you touch raw meat  Make sure any meat is well-cooked before you eat it  Avoid raw eggs and unpasteurized milk  Use gloves or ask someone else to clean your cat's litter box while you are pregnant  What changes are happening with my baby? By 30 weeks, your baby may weigh more than 3 pounds  Your baby may be about 11 inches long from the top of the head to the rump (baby's bottom)  Your baby's eyes open and close now  Your baby's kicks and movements are more forceful at this time  What do I need to know about prenatal care? Your healthcare provider will check your blood pressure and weight  You may also need the following:  · Blood tests  may be done to check for anemia or blood type  · A urine test  may also be done to check for sugar and protein  These can be signs of gestational diabetes or infection  Protein in your urine may also be a sign of preeclampsia  Preeclampsia is a condition that can develop during week 20 or later of your pregnancy   It causes high blood pressure, and it can cause problems with your kidneys and other organs  · A Tdap vaccine and flu vaccine  may be recommended by your healthcare provider  · A gestational diabetes screen  will be done using an oral glucose tolerance test (OGTT)  An OGTT starts with a blood sugar level check after you have not eaten for 8 hours  You are then given a glucose drink  Your blood sugar level is checked after 1 hour, 2 hours, and sometimes 3 hours  Healthcare providers look at how much your blood sugar level increases from the first check  · Fundal height  is a measurement of your uterus to check your baby's growth  This number is usually the same as the number of weeks that you have been pregnant  Your healthcare provider may also check your baby's position  · Your baby's heart rate  will be checked  When should I seek immediate care? · You develop a severe headache that does not go away  · You have new or increased vision changes, such as blurred or spotted vision  · You have new or increased swelling in your face or hands  · You have vaginal spotting or bleeding  · Your water broke or you feel warm water gushing or trickling from your vagina  When should I contact my healthcare provider? · You have more than 5 contractions in 1 hour  · You notice any changes in your baby's movements  · You have abdominal cramps, pressure, or tightening  · You have a change in vaginal discharge  · You have chills or a fever  · You have vaginal itching, burning, or pain  · You have yellow, green, white, or foul-smelling vaginal discharge  · You have pain or burning when you urinate, less urine than usual, or pink or bloody urine  · You have questions or concerns about your condition or care  CARE AGREEMENT:   You have the right to help plan your care  Learn about your health condition and how it may be treated  Discuss treatment options with your caregivers to decide what care you want to receive   You always have the right to refuse treatment  The above information is an  only  It is not intended as medical advice for individual conditions or treatments  Talk to your doctor, nurse or pharmacist before following any medical regimen to see if it is safe and effective for you  © 2017 [address] [employer] Information is for End User's use only and may not be sold, redistributed or otherwise used for commercial purposes  All illustrations and images included in CareNotes® are the copyrighted property of A D A M , Inc  or Samuel Coronel

## 2020-07-17 NOTE — PROGRESS NOTES
Patient is here for routine prenatal visit with no concerns  28 w 2       Patient denies any LOF, VB or cramping  GFM    POCT urine today is negative for both protein and glucose  T-dap administered on right deltoid, patient tolerated well

## 2020-07-17 NOTE — PROGRESS NOTES
Encounter for supervision of normal first pregnancy in first trimester  Return OB  Denies LOF, vag blding, ctxs, cramping and reports good FM  Taking PNV/ASA/FE daily as prescribed  Denies any problems today  TDAP vaccine given today  Reviewed PTL precautions, [address] also reviewed  Antepartum anemia  Will start iron supplement daily  Need for Tdap vaccination  Discussed with patient and the benefits, contraindications and side effects of the following vaccines:  TDAP  Discussed all components of the vaccine

## 2020-07-31 ENCOUNTER — TELEMEDICINE (OUTPATIENT)
Dept: OBGYN CLINIC | Facility: CLINIC | Age: 30
End: 2020-07-31

## 2020-07-31 ENCOUNTER — ROUTINE PRENATAL (OUTPATIENT)
Dept: OBGYN CLINIC | Facility: CLINIC | Age: 30
End: 2020-07-31

## 2020-07-31 ENCOUNTER — TELEPHONE (OUTPATIENT)
Dept: OBGYN CLINIC | Facility: CLINIC | Age: 30
End: 2020-07-31

## 2020-07-31 VITALS — BODY MASS INDEX: 35.87 KG/M2 | WEIGHT: 209 LBS

## 2020-07-31 VITALS
TEMPERATURE: 97.9 F | DIASTOLIC BLOOD PRESSURE: 78 MMHG | SYSTOLIC BLOOD PRESSURE: 120 MMHG | WEIGHT: 211.4 LBS | BODY MASS INDEX: 36.29 KG/M2

## 2020-07-31 DIAGNOSIS — R42 DIZZY: Primary | ICD-10-CM

## 2020-07-31 DIAGNOSIS — Z34.01 ENCOUNTER FOR SUPERVISION OF NORMAL FIRST PREGNANCY IN FIRST TRIMESTER: Primary | ICD-10-CM

## 2020-07-31 DIAGNOSIS — O12.03 SWELLING OF LOWER EXTREMITY DURING PREGNANCY IN THIRD TRIMESTER: ICD-10-CM

## 2020-07-31 PROCEDURE — PNV: Performed by: NURSE PRACTITIONER

## 2020-07-31 PROCEDURE — PNV: Performed by: STUDENT IN AN ORGANIZED HEALTH CARE EDUCATION/TRAINING PROGRAM

## 2020-07-31 NOTE — ASSESSMENT & PLAN NOTE
-precautions reviewed  -s/p Tdap  -prepregnancy BMI 32 with goal weight gain 11-20#: TWG = 19#, encouraged regular activity  -IG sono for obesity at 32 weeks

## 2020-07-31 NOTE — PROGRESS NOTES
Virtual Regular Visit      Assessment/Plan:    Problem List Items Addressed This Visit        Other    Encounter for supervision of normal first pregnancy in first trimester - Primary     -precautions reviewed  -s/p Tdap  -prepregnancy BMI 32 with goal weight gain 11-20#: TWG = 19#, encouraged regular activity  -IG sono for obesity at 32 weeks                    Reason for visit is   Chief Complaint   Patient presents with    Virtual Regular Visit        Encounter provider Mateo Longo MD    Provider located at [address] [address]      Recent Visits  No visits were found meeting these conditions  Showing recent visits within past 7 days and meeting all other requirements     Today's Visits  Date Type Provider Dept   20 Telemedicine Mateo Longo MD [facility] today's visits and meeting all other requirements     Future Appointments  No visits were found meeting these conditions  Showing future appointments within next 150 days and meeting all other requirements        The patient was identified by name and date of birth  Shauna Montoya was informed that this is a telemedicine visit and that the visit is being conducted through [facility]  My office door was closed  No one else was in the room  She acknowledged consent and understanding of privacy and security of the video platform  The patient has agreed to participate and understands they can discontinue the visit at any time  Patient is aware this is a billable service  Subjective  Shauna Montoya is a 35 y o  female  HPI   35 y o   at 30w3d, here for return OB visit  Feeling well overall and without concerns  Good FM  Denies LOF, VB, contractions  Denies dysuria, hematuria  No problems with activity  No questions/concerns         Past Medical History:   Diagnosis Date    Hiatal hernia with GERD        Past Surgical History:   Procedure Laterality Date    CHOLECYSTECTOMY  2013    WISDOM TOOTH EXTRACTION         Current Outpatient Medications   Medication Sig Dispense Refill    Prenatal Vit-Fe Fumarate-FA (PRENATAL COMPLETE PO) Take by mouth      aspirin (ECOTRIN LOW STRENGTH) 81 mg EC tablet Take 2 tablets (162 mg total) by mouth daily (Patient not taking: Reported on 5/26/2020) 180 tablet 3     No current facility-administered medications for this visit  Allergies   Allergen Reactions    Latex Hives     Gloves and condoms     Other Hives     Scented products    Oxycodone-Acetaminophen Hives and Itching     Said she was told percocet but was IV pain medication        Review of Systems as above    Video Exam    Vitals:    07/31/20 0840   Weight: 94 8 kg (209 lb)       Physical Exam   Constitutional: She is oriented to person, place, and time  She appears well-developed and well-nourished  HENT:   Head: Normocephalic  Eyes: EOM are normal    Neck: Normal range of motion  Pulmonary/Chest: Effort normal    Musculoskeletal: Normal range of motion  Neurological: She is alert and oriented to person, place, and time  Psychiatric: She has a normal mood and affect  Her behavior is normal    Vitals reviewed  I spent 8 minutes directly with the patient during this visit      VIRTUAL VISIT DISCLAIMER    Cesia Dubose acknowledges that she has consented to an online visit or consultation  She understands that the online visit is based solely on information provided by her, and that, in the absence of a face-to-face physical evaluation by the physician, the diagnosis she receives is both limited and provisional in terms of accuracy and completeness  This is not intended to replace a full medical face-to-face evaluation by the physician  Cesia Dubose understands and accepts these terms

## 2020-07-31 NOTE — PROGRESS NOTES
Patient has her virtual visit today  O positive  Denies LOF, VB, and contractions  Up to date on prenatal labs  Declines breast pump  Hasn't had flu shot  Tdap given 7/17  Kick count paper mailed to patient  Baby moving well  Urine next visit  Will take BP and let provider know once she logs on

## 2020-07-31 NOTE — LETTER
July 31, 2020     Patient: Shalonda Dunlap   YOB: 1990   Date of Visit: 7/31/2020       To Whom it May Concern:    Shalonda Dunlap is under my professional care  She was seen in my office on 7/31/2020  She may return to work on 8/3/2020  If you have any questions or concerns, please don't hesitate to call           Sincerely,          ANNA Pyle        CC: No Recipients

## 2020-07-31 NOTE — PATIENT INSTRUCTIONS
El embarazo de la semana 27 a la 30   LO QUE NECESITA SABER:   ¿Qué cambios están ocurriendo en mi cuerpo? Usted podría notar síntomas nuevos anali falta de [place] o inflamación de gisell tobillos y pies  Es posible que también tenga dificultad para dormir o contracciones  ¿Cómo me katie cuidar en esta etapa de mi embarazo? · Consuma alimentos saludables y variados  Alimentos saludables incluyen frutas, verduras, panes de brayden integral, alimentos lácteos bajos en grasa, frijoles, ishmael magras y pescado  [place] líquidos anali se le haya indicado  Pregunte cuánto líquido debe jennifer cada día y cuáles líquidos son los más adecuados para usted  Limite el consumo de cafeína a menos de [address]  Limite el consumo de pescado a 2 porciones cada semana  Escoja pescado con concentraciones bajas de genesis anali atún al natural enlatado, camarón, salmón, bacalao o tilapia  No  coma pescado con concentraciones altas de genesis anali pez kurt, caballa gigante, pargo rayado y tiburón  · Controle la acidez  comiendo 4 o 5 comidas pequeñas cada día en vez de comidas grandes  Evite los alimentos picantes  · Controle la inflamación  al [place] y poner los pies en alto o elevados sobre [place]  · [id number] [place]  Rodriguez necesidad de ciertas vitaminas y [facility], anali el ácido fólico, aumenta blanca el [place]  Las vitaminas prenatales proporcionan algunas de las vitaminas y minerales adicionales que usted necesita  Las vitaminas prenatales también podrían ayudar a disminuir el riesgo de ciertos defectos de nacimiento  · Consulte con rodriguez médico acerca de hacer ejercicio  El ejercicio moderado puede ayudarla a mantenerse en forma  Rodriguez médico la ayudará a planear un programa de ejercicios que sea seguro para usted blanca rodriguez [place]  · No fume  Si usted fuma, nunca es demasiado tarde para dejar de hacerlo   Fumar aumenta el riesgo de aborto espontáneo y otros problemas de yfn blanca rodriguez embarazo  Fumar puede causar que rodriguez bebé nazca antes de tiempo o que pese menos al nacer  Solicite información a rodriguez médico si usted necesita ayuda para dejar de fumar  · No consuma alcohol  El alcohol pasa de rodriguez cuerpo al bebé a través de la placenta  Puede afectar el desarrollo del cerebro de rodriguez bebé y provocar el síndrome de alcoholismo fetal (SAF)  FAS es un [place] de condiciones que causan [address], de comportamiento y de crecimiento  · Consulte con rodriguez médico antes de jennifer cualquier medicamento  Muchos medicamentos pueden perjudicar a rodriguez bebé si usted los rikki [facility] [address]  No tome ningún medicamento, vitaminas, hierbas o suplementos sin pelon consultar con rodriguez Radha Bibber  use drogas ilegales o de la wilson (anali marihuana o cocaína) mientras está embarazada  ¿Cuáles son [place] consejos de seguridad blanca el embarazo? · Evite jacuzzis y saunas  No use un jacuzzi o un sauna mientras usted está embarazada, especialmente blanca el primer trimestre  [facility] y los saunas aumentan la temperatura de rodriguez bebé y el riesgo de defectos de nacimiento  · Evite la toxoplasmosis  [place] es kirsty infección causada por comer carne cruda o estar cerca del excremento de un dulce maria infectado  [place] puede causar malformaciones congénitas, aborto espontáneo y Lavelle Bravoese las donato después de tocar carne cruda  Asegúrese de que la carne esté karol cocida antes de comerla  Evite los huevos crudos y la [place]  Use guantes o pida que alguien la ayude a limpiar la caja de arena del dulce maria mientras usted Joelene Enola  ¿Qué cambios están ocurriendo con mi bebé? Para las 30 semanas, rdoriguez bebé podría pesar más de 3 libras  Rodriguez bebé podría medir alrededor de 11 pulgadas de gena desde la punta de la jerry hasta la rabadilla (parte inferior del bebé)  Ahora rodriguez bebé puede abrir y cerrar gisell ojos   Las patadas y movimientos de rodriguez bebé son [place] gena en nancy momento  ¿Qué necesito saber acerca del cuidado prenatal?  Rodriguez médico le revisará rodriguez presión arterial y [place]  Es posible que también necesite lo siguiente:  · Los análisis de yamilex:  podrían realizarse para revisar signos de anemia o el tipo de [place]  · Un examen de orina  también podría realizarse para revisarle el azúcar y la proteína  Estas son señales de diabetes gestacional o de infección  La proteína en rodriguez orina también podría ser kirsty señal de preeclampsia  La preeclampsia es kirsty condición que puede desarrollarse blanca la semana 21 o después en rodriguez embarazo  Esta provoca presión arterial maría y [employer] con gisell riñones y [place]  · Kirsty vacuna contra difteria, tétanos y tos ferina y vacuna contra la gripe  podría ser recomendado por rodriguez médico      · La detección de diabetes gestacional  se realizará usando kirsty prueba oral de tolerancia a la glucosa  Kirsty prueba oral de tolerancia a la glucosa comienza con kirsty revisión de los niveles de azúcar en la yamilex después de que usted no haya comido por 8 horas  Después se le da kirsty bebida de glucosa  Le revisan el nivel de azúcar en la yamilex después de 1 hora, 2 horas y algunas veces 3 horas  Los médicos analizarán si el nivel de azúcar en la yamilex aumenta después del primer análisis  · La altura uterina  es kirsty medición del útero para controlar el desarrollo de rodriguez bebé  [employer] por lo general es igual al número de [address] que usted tiene de embarazo  Rodriguez médico también podría revisar la posición de rodriguez bebé  · El ritmo cardíaco de rodriguez bebé  será revisado  ¿Cuándo katie buscar atención inmediata? · Usted presenta un kathryn dolor de jerry que no desaparece  · Usted tiene cambios en la visión nuevos o en aumento, anali visión borrosa o con manchas  · Usted tiene inflamación nueva o creciente en rodriguez gagan o donato      · Usted tiene manchado o sangrado vaginal     · Usted rompe lali o siente un chorro o gotas de agua tibia que le está bajando por mendieta vagina  ¿Cuándo katie comunicarme con mi médico?   · Usted tiene más de 5 contracciones en 1 hora  · Usted nota algún cambio en los movimientos de mendieta bebé  · Usted tiene calambres, presión o tensión abdominal     · Usted tiene un cambio en la secreción vaginal     · Usted tiene escalofríos o fiebre  · Usted tiene comezón, ardor o dolor vaginal      · Usted tiene kirsty secreción vaginal amarillenta, verdosa, shantel o de [employer]  · Usted tiene dolor o ardor al [place], orina menos de lo habitual o tiene [place] rosada o sanguinolenta  · Usted tiene preguntas o inquietudes acerca de mendieta condición o cuidado  ACUERDOS SOBRE MENDIETA CUIDADO:   Usted tiene el derecho de ayudar a planear mendieta cuidado  Aprenda todo lo que pueda sobre mendieta condición y anali darle tratamiento  Discuta gisell opciones de tratamiento con gisell médicos para decidir el cuidado que usted desea recibir  Usted siempre tiene el derecho de rechazar el tratamiento  Esta información es sólo para uso en educación  Mendieta intención no es darle un consejo médico sobre enfermedades o tratamientos  Colsulte con mendieta [place] farmacéutico antes de seguir cualquier régimen médico para saber si es seguro y efectivo para usted  © 2017 [address] Information is for End User's use only and may not be sold, redistributed or otherwise used for commercial purposes  All illustrations and images included in CareNotes® are the copyrighted property of A D A M , Inc  or Samuel Coronel

## 2020-07-31 NOTE — ASSESSMENT & PLAN NOTE
Patient called office today and informed staff that she felt dizzy, with palpitations, had noticeable increased swelling while at work today, epigastric discomfort  On-call provider AM was notified and offered for pt to go to L&D @ [facility] for Yaneli Collins or the the office and pt declined the L&D option and chose to come to the office, which we also agreed for her as well  She reported good fetal movement, denies RUQ pain, visual changes, there was +1 ankle and mayi pedal edema and she denied ctxs, cramping, and vag blding  She denies leg pain, no redness  BP was 120/78  Lungs clear, HR regular, so pt was cleared to go home and just rest for the duration of the day, actually feeling a little better so instructed to watch salt intake, put feet up, eat a small meal, note was given for work  Monitor her symptoms and if any symptoms of RUQ, visual changes, CTXS, cramping, decreased FM, to call office number at anytime to reach on-call provider  RTO for next scheduled OB visit

## 2020-07-31 NOTE — PROGRESS NOTES
Patient is here for problem visit with dizziness, heart palpations cramping swelling in both feet and numbness down both legs  POCT urine today was positive for protein and negative for glucose      30 w 3d

## 2020-07-31 NOTE — PROGRESS NOTES
Dizzy  Patient called office today and informed staff that she felt dizzy, with palpitations, had noticeable increased swelling while at work today, epigastric discomfort  On-call provider AM was notified and offered for pt to go to L&D @ [facility] for Therman Gallon or the the office and pt declined the L&D option and chose to come to the office, which we also agreed for her as well  She reported good fetal movement, denies RUQ pain, visual changes, there was +1 ankle and mayi pedal edema and she denied ctxs, cramping, and vag blding  She denies leg pain, no redness  BP was 120/78  Lungs clear, HR regular, so pt was cleared to go home and just rest for the duration of the day, actually feeling a little better so instructed to watch salt intake, put feet up, eat a small meal, note was given for work  Monitor her symptoms and if any symptoms of RUQ, visual changes, CTXS, cramping, decreased FM, to call office number at anytime to reach on-call provider  RTO for next scheduled OB visit

## 2020-07-31 NOTE — TELEPHONE ENCOUNTER
Returned pts' p c - pt states she is feeling dizzy, feels equilibrium is off, heart palpitations, cramping in pelvis, spots in front of eyes, "  BP this am 114/80, denies VB, LOF, cxtns, h/a, epigastric pain, states is drinking required 6-8 glasses water daily, & eating sm snacks between meals  Routed to [facility] on- call , Dr Jamie Ramírez  Per dr Becka Low pt to retake her BP  -informed pt , but pt does have a BP cuff or stethoscope with her- at work until 7pm  Rerouted that information & pts' symptoms of increased pedal edema to dr Becka Low  Per Dr Jamie Ramírez, pt to be seen for office evaluation, or L & D triage  No available office appts for this afternoon-L/M on pts' voice mail  recommending pt report to triage for evaluation  Advised pt to call back to confirm message was recevied

## 2020-08-10 ENCOUNTER — TELEPHONE (OUTPATIENT)
Dept: PERINATAL CARE | Facility: OTHER | Age: 30
End: 2020-08-10

## 2020-08-10 NOTE — TELEPHONE ENCOUNTER
Spoke with patient and confirmed appointment with [facility]  1 support person ( must be over age of 15) may accompany patient  Will you and your support person be able to wear a mask ,without a valve , during entire appointment? yes   To minimize your exposure in our waiting area,check in and rooming questions will be done via phone  When you arrive in the parking lot please call the following inside line # prior to entering office:    Daniella Velasquez [phone] line: [phone] line:  [phone] line: [phone]  Christopher Bray line:  [phone]  [place] line: [phone]    Have you or your support person traveled outside the state in the last 2 weeks?no   If yes, what state did you travel to? no     Do you or your support person have:  Fever or flu- like symptoms?no  Symptoms of upper respiratory infection like runny nose, sore throat or cough? no  Do you have new headache that you have not had in the past?no  Have you experienced any new shortness of breath recently?no  Do you have any new loss of taste or smell?no  Do you have any new diarrhea, nausea or vomiting?no  Have you recently been in contact with anyone who has been sick or diagnosed with COVID-19 infection?no  Have you been recommended to quarantine because of an exposure to a confirmed positive COVID19 person?no  You and your support person will have temperature screening upon arrival   Patient verbalized understanding of all instructions   -------------------------------------------------------------

## 2020-08-11 ENCOUNTER — ULTRASOUND (OUTPATIENT)
Dept: PERINATAL CARE | Facility: OTHER | Age: 30
End: 2020-08-11
Payer: COMMERCIAL

## 2020-08-11 VITALS
HEART RATE: 84 BPM | WEIGHT: 212.2 LBS | DIASTOLIC BLOOD PRESSURE: 74 MMHG | BODY MASS INDEX: 36.23 KG/M2 | HEIGHT: 64 IN | TEMPERATURE: 98.1 F | SYSTOLIC BLOOD PRESSURE: 113 MMHG

## 2020-08-11 DIAGNOSIS — IMO0002 EVALUATE ANATOMY NOT SEEN ON PRIOR SONOGRAM: ICD-10-CM

## 2020-08-11 DIAGNOSIS — Z3A.32 32 WEEKS GESTATION OF PREGNANCY: ICD-10-CM

## 2020-08-11 DIAGNOSIS — Z36.89 ENCOUNTER FOR ULTRASOUND TO ASSESS FETAL GROWTH: Primary | ICD-10-CM

## 2020-08-11 DIAGNOSIS — O99.212 MATERNAL OBESITY, ANTEPARTUM, SECOND TRIMESTER: ICD-10-CM

## 2020-08-11 PROBLEM — N91.2 AMENORRHEA: Status: RESOLVED | Noted: 2020-02-27 | Resolved: 2020-08-11

## 2020-08-11 PROBLEM — K44.9 HIATAL HERNIA: Status: ACTIVE | Noted: 2020-08-11

## 2020-08-11 PROBLEM — Z34.01 ENCOUNTER FOR SUPERVISION OF NORMAL FIRST PREGNANCY IN FIRST TRIMESTER: Status: RESOLVED | Noted: 2020-03-26 | Resolved: 2020-08-11

## 2020-08-11 PROBLEM — Z32.01 POSITIVE PREGNANCY TEST: Status: RESOLVED | Noted: 2020-02-27 | Resolved: 2020-08-11

## 2020-08-11 PROCEDURE — 76816 OB US FOLLOW-UP PER FETUS: CPT | Performed by: OBSTETRICS & GYNECOLOGY

## 2020-08-11 PROCEDURE — 99212 OFFICE O/P EST SF 10 MIN: CPT | Performed by: OBSTETRICS & GYNECOLOGY

## 2020-08-11 NOTE — PROGRESS NOTES
[address]: Ms Miky Choi was seen today at 32w0d for fetal growth and followup missed anatomy ultrasound  See ultrasound report under "OB Procedures" tab    Please don't hesitate to contact our office with any concerns or questions   -Cristie Leventhal, MD

## 2020-08-13 ENCOUNTER — ROUTINE PRENATAL (OUTPATIENT)
Dept: OBGYN CLINIC | Age: 30
End: 2020-08-13

## 2020-08-13 VITALS — BODY MASS INDEX: 35.53 KG/M2 | DIASTOLIC BLOOD PRESSURE: 72 MMHG | WEIGHT: 207 LBS | SYSTOLIC BLOOD PRESSURE: 124 MMHG

## 2020-08-13 DIAGNOSIS — Z34.03 ENCOUNTER FOR SUPERVISION OF NORMAL FIRST PREGNANCY IN THIRD TRIMESTER: Primary | ICD-10-CM

## 2020-08-13 DIAGNOSIS — Z3A.32 32 WEEKS GESTATION OF PREGNANCY: ICD-10-CM

## 2020-08-13 DIAGNOSIS — Z23 NEED FOR TDAP VACCINATION: ICD-10-CM

## 2020-08-13 DIAGNOSIS — O99.213 OBESITY AFFECTING PREGNANCY IN THIRD TRIMESTER: ICD-10-CM

## 2020-08-13 DIAGNOSIS — K44.9 HIATAL HERNIA: ICD-10-CM

## 2020-08-13 LAB
SL AMB  POCT GLUCOSE, UA: NORMAL
SL AMB POCT URINE PROTEIN: NORMAL

## 2020-08-13 PROCEDURE — PNV: Performed by: NURSE PRACTITIONER

## 2020-08-13 RX ORDER — PANTOPRAZOLE SODIUM 20 MG/1
20 TABLET, DELAYED RELEASE ORAL DAILY
Qty: 30 TABLET | Refills: 0 | Status: SHIPPED | OUTPATIENT
Start: 2020-08-13 | End: 2020-09-14 | Stop reason: ALTCHOICE

## 2020-08-13 NOTE — PROGRESS NOTES
Problem List Items Addressed This Visit        Other    Obesity affecting pregnancy in third trimester    Need for Tdap vaccination    32 weeks gestation of pregnancy    Hiatal hernia      Other Visit Diagnoses     Encounter for supervision of normal first pregnancy in third trimester    -  Primary    Relevant Orders    POCT urine dip (Completed)        Feels well OVERALL  Tums did not help much for her reflux  Will send script for Protonix  She will try a Belly Band for support  PT IS NOT TAKING DAILY ASA AS PER RECOMMENDATIONS  Denies LOF/CTX/VB  Discussed fetal kick counting  No concerns  Advised Social Distancing and frequent handwashing, limited social/work interactions etc   Advised to increase fluids on the warmer days

## 2020-08-13 NOTE — PATIENT INSTRUCTIONS
Pregnancy at [address] to [address]:   What changes are happening in my body? You may continue to have symptoms such as shortness of breath, heartburn, contractions, or swelling of your ankles and feet  You may be gaining about 1 pound a week now  How do I care for myself at this stage of my pregnancy? · Eat a variety of healthy foods  Healthy foods include fruits, vegetables, whole-grain breads, low-fat dairy foods, beans, lean meats, and fish  Drink liquids as directed  Ask how much liquid to drink each day and which liquids are best for you  Limit caffeine to less than 200 milligrams each day  Limit your intake of fish to 2 servings each week  Choose fish low in mercury such as canned light tuna, shrimp, salmon, cod, or tilapia  Do not  eat fish high in mercury such as swordfish, tilefish, richie mackerel, and shark  · Manage heartburn  by eating 4 or 5 small meals each day instead of large meals  Avoid spicy food  · Manage swelling  by lying down and putting your feet up  · Take prenatal vitamins as directed  Your need for certain vitamins and minerals, such as folic acid, increases during pregnancy  Prenatal vitamins provide some of the extra vitamins and minerals you need  Prenatal vitamins may also help to decrease the risk of certain birth defects  · Talk to your healthcare provider about exercise  Moderate exercise can help you stay fit  Your healthcare provider will help you plan an exercise program that is safe for you during pregnancy  · Do not smoke  If you smoke, it is never too late to quit  Smoking increases your risk of a miscarriage and other health problems during your pregnancy  Smoking can cause your baby to be born too early or weigh less at birth  Ask your healthcare provider for information if you need help quitting  · Do not drink alcohol  Alcohol passes from your body to your baby through the placenta   It can affect your baby's brain development and cause fetal alcohol syndrome (FAS)  FAS is a group of conditions that causes mental, behavior, and growth problems  · Talk to your healthcare provider before you take any medicines  Many medicines may harm your baby if you take them when you are pregnant  Do not take any medicines, vitamins, herbs, or supplements without first talking to your healthcare provider  Never use illegal or street drugs (such as marijuana or cocaine) while you are pregnant  What are some safety tips during pregnancy? · Avoid hot tubs and saunas  Do not use a hot tub or sauna while you are pregnant, especially during your first trimester  Hot tubs and saunas may raise your baby's temperature and increase the risk of birth defects  · Avoid toxoplasmosis  This is an infection caused by eating raw meat or being around infected cat feces  It can cause birth defects, miscarriages, and other problems  Wash your hands after you touch raw meat  Make sure any meat is well-cooked before you eat it  Avoid raw eggs and unpasteurized milk  Use gloves or ask someone else to clean your cat's litter box while you are pregnant  What changes are happening with my baby? By 34 weeks, your baby may weigh more than 5 pounds  Your baby will be about 12 ½ inches long from the top of the head to the rump (baby's bottom)  Your baby is gaining about ½ pound a week  Your baby's eyes open and close now  Your baby's kicks and movements are more forceful at this time  What do I need to know about prenatal care? Your healthcare provider will check your blood pressure and weight  You may also need the following:  · A urine test  may also be done to check for sugar and protein  These can be signs of gestational diabetes or infection  Protein in your urine may also be a sign of preeclampsia  Preeclampsia is a condition that can develop during week 20 or later of your pregnancy   It causes high blood pressure, and it can cause problems with your kidneys and other organs  · A Tdap vaccine  may be recommended by your healthcare provider  · Fundal height  is a measurement of your uterus to check your baby's growth  This number is usually the same as the number of weeks that you have been pregnant  Your healthcare provider may also check your baby's position  · Your baby's heart rate  will be checked  When should I seek immediate care? · You develop a severe headache that does not go away  · You have new or increased vision changes, such as blurred or spotted vision  · You have new or increased swelling in your face or hands  · You have vaginal spotting or bleeding  · Your water broke or you feel warm water gushing or trickling from your vagina  When should I contact my healthcare provider? · You have more than 5 contractions in 1 hour  · You notice any changes in your baby's movements  · You have abdominal cramps, pressure, or tightening  · You have a change in vaginal discharge  · You have chills or a fever  · You have vaginal itching, burning, or pain  · You have yellow, green, white, or foul-smelling vaginal discharge  · You have pain or burning when you urinate, less urine than usual, or pink or bloody urine  · You have questions or concerns about your condition or care  CARE AGREEMENT:   You have the right to help plan your care  Learn about your health condition and how it may be treated  Discuss treatment options with your caregivers to decide what care you want to receive  You always have the right to refuse treatment  The above information is an  only  It is not intended as medical advice for individual conditions or treatments  Talk to your doctor, nurse or pharmacist before following any medical regimen to see if it is safe and effective for you    © 2017 [place] Information is for End User's use only and may not be sold, redistributed or otherwise used for commercial purposes  All illustrations and images included in CareNotes® are the copyrighted property of A D A M , Inc  or Samuel Coronel

## 2020-08-27 ENCOUNTER — ROUTINE PRENATAL (OUTPATIENT)
Dept: OBGYN CLINIC | Age: 30
End: 2020-08-27

## 2020-08-27 VITALS
BODY MASS INDEX: 36.02 KG/M2 | WEIGHT: 211 LBS | SYSTOLIC BLOOD PRESSURE: 122 MMHG | DIASTOLIC BLOOD PRESSURE: 74 MMHG | HEIGHT: 64 IN | HEART RATE: 94 BPM | TEMPERATURE: 98.6 F

## 2020-08-27 DIAGNOSIS — O99.213 OBESITY AFFECTING PREGNANCY IN THIRD TRIMESTER: ICD-10-CM

## 2020-08-27 DIAGNOSIS — Z34.03 ENCOUNTER FOR SUPERVISION OF NORMAL FIRST PREGNANCY IN THIRD TRIMESTER: Primary | ICD-10-CM

## 2020-08-27 DIAGNOSIS — O99.019 ANTEPARTUM ANEMIA: ICD-10-CM

## 2020-08-27 LAB
SL AMB  POCT GLUCOSE, UA: ABNORMAL
SL AMB POCT URINE PROTEIN: 1

## 2020-08-27 PROCEDURE — PNV: Performed by: STUDENT IN AN ORGANIZED HEALTH CARE EDUCATION/TRAINING PROGRAM

## 2020-08-27 RX ORDER — FERROUS SULFATE 325(65) MG
325 TABLET ORAL
COMMUNITY
End: 2020-10-05 | Stop reason: HOSPADM

## 2020-08-27 NOTE — PROGRESS NOTES
Routine OB 34w2d  Overall feels well  Denies any loss of fluid, bleeding or contractions  She confirms good fetal movement  Urine Dip NEG for glucose and +1 for protein  Discussed increasing her fluids  Reviewed Last Weeks of Pregnancy and Perinial massage

## 2020-08-27 NOTE — PROGRESS NOTES
36 yo  at 29 2/7 weeks here for routine PNV  Denies LOF/VB/ctx/decreased FM  Discussed plan for GBS, cervical check next visit    Antepartum anemia  Has been taking iron supplementation  Asymptomatic

## 2020-09-08 ENCOUNTER — ROUTINE PRENATAL (OUTPATIENT)
Dept: OBGYN CLINIC | Age: 30
End: 2020-09-08

## 2020-09-08 VITALS — WEIGHT: 216 LBS | BODY MASS INDEX: 37.08 KG/M2 | SYSTOLIC BLOOD PRESSURE: 122 MMHG | DIASTOLIC BLOOD PRESSURE: 62 MMHG

## 2020-09-08 DIAGNOSIS — O99.213 OBESITY AFFECTING PREGNANCY IN THIRD TRIMESTER: ICD-10-CM

## 2020-09-08 DIAGNOSIS — Z34.03 ENCOUNTER FOR SUPERVISION OF NORMAL FIRST PREGNANCY IN THIRD TRIMESTER: Primary | ICD-10-CM

## 2020-09-08 DIAGNOSIS — O99.019 ANTEPARTUM ANEMIA: ICD-10-CM

## 2020-09-08 DIAGNOSIS — Z3A.36 36 WEEKS GESTATION OF PREGNANCY: ICD-10-CM

## 2020-09-08 LAB
SL AMB  POCT GLUCOSE, UA: NORMAL
SL AMB POCT URINE PROTEIN: NORMAL

## 2020-09-08 PROCEDURE — PNV: Performed by: NURSE PRACTITIONER

## 2020-09-08 PROCEDURE — 87081 CULTURE SCREEN ONLY: CPT | Performed by: NURSE PRACTITIONER

## 2020-09-08 NOTE — PROGRESS NOTES
Problem List Items Addressed This Visit        Other    Obesity affecting pregnancy in third trimester    Antepartum anemia    36 weeks gestation of pregnancy      Other Visit Diagnoses     Encounter for supervision of normal first pregnancy in third trimester    -  Primary    Relevant Orders    Strep B DNA probe, amplification    POCT urine dip (Completed)        Feels well  Denies LOF/CTX/VB  No concerns  Discussed fetal kick counting  Encouraged to continue with her vaginal massages  GBS done  Advised to increase fluids on the warmer days    Advised Social Distancing and frequent handwashing, limited social/work interactions etc

## 2020-09-08 NOTE — PATIENT INSTRUCTIONS
Pregnancy at 28 to 38 Weeks   AMBULATORY CARE:   What changes are happening to your body: You are considered full term at the beginning of 37 weeks  Your breathing may be easier if your baby has moved down into a head-down position  You may need to urinate more often because the baby may be pressing on your bladder  You may also feel more discomfort and get tired easily  Seek care immediately if:   · You develop a severe headache that does not go away  · You have new or increased vision changes, such as blurred or spotted vision  · You have new or increased swelling in your face or hands  · You have vaginal spotting or bleeding  · Your water broke or you feel warm water gushing or trickling from your vagina  Contact your healthcare provider if:   · You have more than 5 contractions in 1 hour  · You notice any changes in your baby's movements  · You have abdominal cramps, pressure, or tightening  · You have a change in vaginal discharge  · You have chills or a fever  · You have vaginal itching, burning, or pain  · You have yellow, green, white, or foul-smelling vaginal discharge  · You have pain or burning when you urinate, less urine than usual, or pink or bloody urine  · You have questions or concerns about your condition or care  How to care for yourself at this stage of your pregnancy:   · Eat a variety of healthy foods  Healthy foods include fruits, vegetables, whole-grain breads, low-fat dairy foods, beans, lean meats, and fish  Drink liquids as directed  Ask how much liquid to drink each day and which liquids are best for you  Limit caffeine to less than 200 milligrams each day  Limit your intake of fish to 2 servings each week  Choose fish low in mercury such as canned light tuna, shrimp, crab, salmon, cod, or tilapia  Do not  eat fish high in mercury such as swordfish, tilefish, richie mackerel, and shark  · Take prenatal vitamins as directed    Your need for certain vitamins and minerals, such as folic acid, increases during pregnancy  Prenatal vitamins provide some of the extra vitamins and minerals you need  Prenatal vitamins may also help to decrease the risk of certain birth defects  · Rest as needed  Put your feet up if you have swelling in your ankles and feet  · Do not smoke  If you smoke, it is never too late to quit  Smoking increases your risk of a miscarriage and other health problems during your pregnancy  Smoking can cause your baby to be born too early or weigh less at birth  Ask your healthcare provider for information if you need help quitting  · Do not drink alcohol  Alcohol passes from your body to your baby through the placenta  It can affect your baby's brain development and cause fetal alcohol syndrome (FAS)  FAS is a group of conditions that causes mental, behavior, and growth problems  · Talk to your healthcare provider before you take any medicines  Many medicines may harm your baby if you take them when you are pregnant  Do not take any medicines, vitamins, herbs, or supplements without first talking to your healthcare provider  Never use illegal or street drugs (such as marijuana or cocaine) while you are pregnant  · Talk to your healthcare provider before you travel  You may not be able to travel in an airplane after 36 weeks  He may also recommend that you avoid long road trips  Safety tips during pregnancy:   · Avoid hot tubs and saunas  Do not use a hot tub or sauna while you are pregnant, especially during your first trimester  Hot tubs and saunas may raise your baby's temperature and increase the risk of birth defects  · Avoid toxoplasmosis  This is an infection caused by eating raw meat or being around infected cat feces  It can cause birth defects, miscarriages, and other problems  Wash your hands after you touch raw meat  Make sure any meat is well-cooked before you eat it   Avoid raw eggs and unpasteurized milk  Use gloves or ask someone else to clean your cat's litter box while you are pregnant  · Ask your healthcare provider about travel  The most comfortable time to travel is during the second trimester  Ask your healthcare provider if you can travel after 36 weeks  You may not be able to travel in an airplane after 36 weeks  He may also recommend that you avoid long road trips  Changes that are happening with your baby:  By 38 weeks, your baby may weigh between 6 and 9 pounds  Your baby may be about 14 inches long from the top of the head to the rump (baby's bottom)  Your baby hears well enough to know your voice  As your baby gets larger, you may feel fewer kicks and more stretching and rolling  Your baby may move into a head-down position  Your baby will also rest lower in your abdomen  What you need to know about prenatal care: Your healthcare provider will check your blood pressure and weight  You may also need the following:  · A urine test  may also be done to check for sugar and protein  These can be signs of gestational diabetes or infection  Protein in your urine may also be a sign of preeclampsia  Preeclampsia is a condition that can develop during week 20 or later of your pregnancy  It causes high blood pressure, and it can cause problems with your kidneys and other organs  · A blood test  may be done to check for anemia (low iron level)  · A Tdap vaccine  may be recommended by your healthcare provider  · A group B strep test  is a test that is done to check for group B strep infection  Group B strep is a type of bacteria that may be found in the vagina or rectum  It can be passed to your baby during delivery if you have it  Your healthcare provider will take swab your vagina or rectum and send the sample to the lab for tests  · Fundal height  is a measurement of your uterus to check your baby's growth   This number is usually the same as the number of weeks that you have been pregnant  Your healthcare provider may also check your baby's position  · Your baby's heart rate  will be checked  © 2017 [employer] Information is for End User's use only and may not be sold, redistributed or otherwise used for commercial purposes  All illustrations and images included in CareNotes® are the copyrighted property of [employer]  or [employer] Coronel  The above information is an  only  It is not intended as medical advice for individual conditions or treatments  Talk to your doctor, nurse or pharmacist before following any medical regimen to see if it is safe and effective for you

## 2020-09-11 LAB
EXTERNAL GROUP B STREP ANTIGEN: NEGATIVE
GP B STREP GENITAL QL CULT: NORMAL

## 2020-09-14 ENCOUNTER — ROUTINE PRENATAL (OUTPATIENT)
Dept: OBGYN CLINIC | Age: 30
End: 2020-09-14

## 2020-09-14 VITALS
TEMPERATURE: 98 F | WEIGHT: 217.8 LBS | SYSTOLIC BLOOD PRESSURE: 122 MMHG | DIASTOLIC BLOOD PRESSURE: 94 MMHG | BODY MASS INDEX: 37.39 KG/M2

## 2020-09-14 DIAGNOSIS — Z34.03 ENCOUNTER FOR SUPERVISION OF NORMAL FIRST PREGNANCY IN THIRD TRIMESTER: Primary | ICD-10-CM

## 2020-09-14 DIAGNOSIS — O99.019 ANTEPARTUM ANEMIA: ICD-10-CM

## 2020-09-14 LAB
SL AMB  POCT GLUCOSE, UA: NEGATIVE
SL AMB POCT URINE PROTEIN: POSITIVE

## 2020-09-14 PROCEDURE — PNV: Performed by: NURSE PRACTITIONER

## 2020-09-14 NOTE — PROGRESS NOTES
Patient is here for routine prenatal visit    39 w 6d      Patient denies any LOF, VB and GFM  Patient states she had CTX for 2 hrs on Saturday  POCT urine today is 1 + protein and negative for glucose

## 2020-09-14 NOTE — PATIENT INSTRUCTIONS
Pregnancy at [address] to [address]:   What changes are happening in my body? You are considered full term at the beginning of 37 weeks  Your breathing may be easier if your baby has moved down into a head-down position  You may need to urinate more often because the baby may be pressing on your bladder  You may also feel more discomfort and get tired easily  How do I care for myself at this stage of my pregnancy? · Eat a variety of healthy foods  Healthy foods include fruits, vegetables, whole-grain breads, low-fat dairy foods, beans, lean meats, and fish  Drink liquids as directed  Ask how much liquid to drink each day and which liquids are best for you  Limit caffeine to less than 200 milligrams each day  Limit your intake of fish to 2 servings each week  Choose fish low in mercury such as canned light tuna, shrimp, salmon, cod, or tilapia  Do not  eat fish high in mercury such as swordfish, tilefish, richie mackerel, and shark  · Take prenatal vitamins as directed  Your need for certain vitamins and minerals, such as folic acid, increases during pregnancy  Prenatal vitamins provide some of the extra vitamins and minerals you need  Prenatal vitamins may also help to decrease the risk of certain birth defects  · Rest as needed  Put your feet up if you have swelling in your ankles and feet  · Do not smoke  If you smoke, it is never too late to quit  Smoking increases your risk of a miscarriage and other health problems during your pregnancy  Smoking can cause your baby to be born too early or weigh less at birth  Ask your healthcare provider for information if you need help quitting  · Do not drink alcohol  Alcohol passes from your body to your baby through the placenta  It can affect your baby's brain development and cause fetal alcohol syndrome (FAS)  FAS is a group of conditions that causes mental, behavior, and growth problems       · Talk to your healthcare provider before you take any medicines  Many medicines may harm your baby if you take them when you are pregnant  Do not take any medicines, vitamins, herbs, or supplements without first talking to your healthcare provider  Never use illegal or street drugs (such as marijuana or cocaine) while you are pregnant  · Talk to your healthcare provider before you travel  You may not be able to travel in an airplane after 36 weeks  He may also recommend that you avoid long road trips  What are some safety tips during pregnancy? · Avoid hot tubs and saunas  Do not use a hot tub or sauna while you are pregnant, especially during your first trimester  Hot tubs and saunas may raise your baby's temperature and increase the risk of birth defects  · Avoid toxoplasmosis  This is an infection caused by eating raw meat or being around infected cat feces  It can cause birth defects, miscarriages, and other problems  Wash your hands after you touch raw meat  Make sure any meat is well-cooked before you eat it  Avoid raw eggs and unpasteurized milk  Use gloves or ask someone else to clean your cat's litter box while you are pregnant  · Ask your healthcare provider about travel  The most comfortable time to travel is during the second trimester  Ask your healthcare provider if you can travel after 36 weeks  You may not be able to travel in an airplane after 36 weeks  He may also recommend that you avoid long road trips  What changes are happening with my baby? By 38 weeks, your baby may weigh between 6 and 9 pounds  Your baby may be about 14 inches long from the top of the head to the rump (baby's bottom)  Your baby hears well enough to know your voice  As your baby gets larger, you may feel fewer kicks and more stretching and rolling  Your baby may move into a head-down position  Your baby will also rest lower in your abdomen  What do I need to know about prenatal care?   Your healthcare provider will check your blood pressure and weight  You may also need the following:  · A urine test  may also be done to check for sugar and protein  These can be signs of gestational diabetes or infection  Protein in your urine may also be a sign of preeclampsia  Preeclampsia is a condition that can develop during week 20 or later of your pregnancy  It causes high blood pressure, and it can cause problems with your kidneys and other organs  · A blood test  may be done to check for anemia (low iron level)  · A Tdap vaccine  may be recommended by your healthcare provider  · A group B strep test  is a test that is done to check for group B strep infection  Group B strep is a type of bacteria that may be found in the vagina or rectum  It can be passed to your baby during delivery if you have it  Your healthcare provider will take swab your vagina or rectum and send the sample to the lab for tests  · Fundal height  is a measurement of your uterus to check your baby's growth  This number is usually the same as the number of weeks that you have been pregnant  Your healthcare provider may also check your baby's position  · Your baby's heart rate  will be checked  When should I seek immediate care? · You develop a severe headache that does not go away  · You have new or increased vision changes, such as blurred or spotted vision  · You have new or increased swelling in your face or hands  · You have vaginal spotting or bleeding  · Your water broke or you feel warm water gushing or trickling from your vagina  When should I contact my healthcare provider? · You have more than 5 contractions in 1 hour  · You notice any changes in your baby's movements  · You have abdominal cramps, pressure, or tightening  · You have a change in vaginal discharge  · You have chills or a fever  · You have vaginal itching, burning, or pain  · You have yellow, green, white, or foul-smelling vaginal discharge      · You have pain or burning when you urinate, less urine than usual, or pink or bloody urine  · You have questions or concerns about your condition or care  CARE AGREEMENT:   You have the right to help plan your care  Learn about your health condition and how it may be treated  Discuss treatment options with your caregivers to decide what care you want to receive  You always have the right to refuse treatment  The above information is an  only  It is not intended as medical advice for individual conditions or treatments  Talk to your doctor, nurse or pharmacist before following any medical regimen to see if it is safe and effective for you  © 2017 [employer] Information is for End User's use only and may not be sold, redistributed or otherwise used for commercial purposes  All illustrations and images included in CareNotes® are the copyrighted property of [employer]  or Samuel Coronel

## 2020-09-14 NOTE — PROGRESS NOTES
Encounter for supervision of normal first pregnancy in third trimester  Return OB  Denies LOF, vag blding, ctxs, occasional cramping and reports good FM  Taking PNV/FE daily as prescribed  Denies any problems today  Reviewed getting breast pump on MBU with lactation  Reviewed Labor precautions, [address] also reviewed          Antepartum anemia  Taking FE daily

## 2020-09-14 NOTE — ASSESSMENT & PLAN NOTE
Return OB  Denies LOF, vag blding, ctxs, occasional cramping and reports good FM  Taking PNV/FE daily as prescribed  Denies any problems today  Reviewed getting breast pump on MBU with lactation  Reviewed Labor precautions, [address] also reviewed

## 2020-09-24 ENCOUNTER — HOSPITAL ENCOUNTER (OUTPATIENT)
Facility: HOSPITAL | Age: 30
Discharge: HOME | End: 2020-09-24
Attending: OBSTETRICS & GYNECOLOGY | Admitting: OBSTETRICS & GYNECOLOGY
Payer: COMMERCIAL

## 2020-09-24 ENCOUNTER — ROUTINE PRENATAL (OUTPATIENT)
Dept: OBGYN CLINIC | Facility: CLINIC | Age: 30
End: 2020-09-24

## 2020-09-24 ENCOUNTER — TELEPHONE (OUTPATIENT)
Dept: OBGYN CLINIC | Facility: CLINIC | Age: 30
End: 2020-09-24

## 2020-09-24 VITALS
DIASTOLIC BLOOD PRESSURE: 84 MMHG | SYSTOLIC BLOOD PRESSURE: 122 MMHG | WEIGHT: 216.6 LBS | TEMPERATURE: 98.4 F | BODY MASS INDEX: 37.18 KG/M2

## 2020-09-24 VITALS
SYSTOLIC BLOOD PRESSURE: 128 MMHG | HEART RATE: 100 BPM | RESPIRATION RATE: 16 BRPM | DIASTOLIC BLOOD PRESSURE: 80 MMHG | TEMPERATURE: 98.4 F

## 2020-09-24 DIAGNOSIS — O99.213 OBESITY AFFECTING PREGNANCY IN THIRD TRIMESTER: ICD-10-CM

## 2020-09-24 DIAGNOSIS — Z34.03 ENCOUNTER FOR SUPERVISION OF NORMAL FIRST PREGNANCY IN THIRD TRIMESTER: Primary | ICD-10-CM

## 2020-09-24 DIAGNOSIS — O99.019 ANTEPARTUM ANEMIA: ICD-10-CM

## 2020-09-24 PROBLEM — Z3A.38 38 WEEKS GESTATION OF PREGNANCY: Status: ACTIVE | Noted: 2020-09-24

## 2020-09-24 LAB
SL AMB  POCT GLUCOSE, UA: NEGATIVE
SL AMB POCT URINE PROTEIN: POSITIVE

## 2020-09-24 PROCEDURE — NC001 PR NO CHARGE: Performed by: FAMILY MEDICINE

## 2020-09-24 PROCEDURE — PNV: Performed by: NURSE PRACTITIONER

## 2020-09-24 PROCEDURE — G0463 HOSPITAL OUTPT CLINIC VISIT: HCPCS

## 2020-09-24 PROCEDURE — 99213 OFFICE O/P EST LOW 20 MIN: CPT

## 2020-09-24 NOTE — PROGRESS NOTES
Encounter for supervision of normal first pregnancy in third trimester  Return OB  Taking PNV daily as prescribed  Denies LOF, vag blding, was having irregular ctxs early today but they stopped  Denies cramping  and reports good FM  SVE 4/75/-2, reviewed with pt to prepare herself for restart of labor and to call office as soon as ctxs are regular for 1 hour  IF SROM call immediately as well  Reviewed Labor precautions, [address] also reviewed  Note given for work to start maternity leave

## 2020-09-24 NOTE — TELEPHONE ENCOUNTER
Pt saw Grace Saldaña 2 hours ago and reported to be 4 cm  She states she has been having a sensation to push and Grace Saldaña told her to time these  She said it is every 5 minutes

## 2020-09-24 NOTE — ASSESSMENT & PLAN NOTE
Return OB  Taking PNV daily as prescribed  Denies LOF, vag blding, was having irregular ctxs early today but they stopped  Denies cramping  and reports good FM  SVE 4/75/-2, reviewed with pt to prepare herself for restart of labor and to call office as soon as ctxs are regular for 1 hour  IF SROM call immediately as well  Reviewed Labor precautions, [address] also reviewed  Note given for work to start maternity leave

## 2020-09-24 NOTE — LETTER
September 24, 2020     Patient: Keny Barksdale   YOB: 1990   Date of Visit: 9/24/2020       To Whom it May Concern:    Keny Barksdale is under my professional care  She was seen in my office on 9/24/2020  She is now starting her maternity leave due to progression of exam   Will return to work after post partum exam       If you have any questions or concerns, please don't hesitate to call           Sincerely,          ANNA Leblanc        CC: No Recipients

## 2020-09-24 NOTE — TELEPHONE ENCOUNTER
Patient states having contractions/pressure every 5 min since leaving office today  In transit to [facility]  L&D notified  On call provider notified

## 2020-09-24 NOTE — PROGRESS NOTES
Triage Note - OB  Cesia Dubose 35 y o  female MRN: [id number]  Unit/Bed#:  TRIAGE 2-01 Encounter: [id number]    OB TRIAGE NOTE  Cesia Dubose  [id number]  2020  10:05 PM  LD TRIAGE 2/LD TRIAGE 2-*    ASSESS:  35 y o   38w2d with irregular contractions but no evidence of labor  Pt comfortable  PLAN  · 2 hour recheck for rule-out labor  SVE: fingertip/50/-3 (checked by Dr Tasha Ortega)  NST/[place]: reactive, irregular contractions  · Labor ruled out  Pt unchanged  Dr Tasha Ortega discussed labor precautions with the pt  · Patient instructed to call if experiencing worsening contractions, vaginal bleeding, loss of fluid or decreased fetal movement  · Will follow up with OBGYN as scheduled    D/w Dr Aba Doherty  ______________    SUBJECTIVE    MARCIAL: Estimated Date of Delivery: 10/6/20    HPI Chronology:  35 y o   38w2d presents with complaint of frequent contractions today  She reports that she was seen at the office today and measured 4 cm but her contractions have become more frequent to every 2-3 minutes  She states that she feels like she needs to have a BM but she is unable to  She denies vaginal bleeding, leakage of fluid, abdominal pain and any other symptoms  She states that she can feel the baby move  Contractions: q2-3min  Leakage: no  Bleeding: no  Fetal Movement: yes    Vitals:   /80   Pulse 100   Temp 98 4 °F (36 9 °C) (Oral)   Resp 16   LMP 2019 (Exact Date)   There is no height or weight on file to calculate BMI  Review of Systems   Eyes: Negative for visual disturbance  Cardiovascular: Negative for chest pain  Gastrointestinal: Negative for abdominal pain  Genitourinary: Negative for decreased urine volume, difficulty urinating, dysuria, vaginal bleeding, vaginal discharge and vaginal pain  Neurological: Negative for headaches  All other systems reviewed and are negative  Physical Exam  Vitals signs reviewed     Constitutional:       General: She is not in acute distress  Appearance: She is well-developed  She is not ill-appearing, toxic-appearing or diaphoretic  HENT:      Head: Normocephalic and atraumatic  Eyes:      Extraocular Movements: Extraocular movements intact  Neck:      Musculoskeletal: Normal range of motion  Cardiovascular:      Rate and Rhythm: Normal rate and regular rhythm  Pulses: Normal pulses  Heart sounds: Normal heart sounds  No murmur  No friction rub  No gallop  Pulmonary:      Effort: Pulmonary effort is normal  No respiratory distress  Breath sounds: Normal breath sounds  No wheezing, rhonchi or rales  Abdominal:      Palpations: Abdomen is soft  Tenderness: There is no abdominal tenderness  Musculoskeletal: Normal range of motion  Right lower leg: Edema present  Left lower leg: Edema present  Skin:     General: Skin is warm and dry  Findings: No rash  Neurological:      Mental Status: She is alert and oriented to person, place, and time  Psychiatric:         Mood and Affect: Mood normal          Behavior: Behavior normal            Cervix: Fingertip/50/-3       FHT:  Baseline Rate: 145 bpm  Variability: Moderate 6-25 bpm  Accelerations: Periodic, 15 x 15 or greater, At variable times, With fetal movment  Decelerations: None  FHR Category: Category I  TOCO:   Contraction Frequency (minutes): 2-5  Contraction Duration (seconds): 40-80  Contraction Quality: Mild    Labs:   Recent Results (from the past 24 hour(s))   POCT urine dip    Collection Time: 09/24/20  2:41 PM   Result Value Ref Range    POCT URINE PROTEIN positive     GLUCOSE, UA negative        Lab, Imaging and other studies: I have personally reviewed pertinent reports          Burke Allen MD  9/24/2020  10:05 PM

## 2020-09-24 NOTE — PROGRESS NOTES
Patient is here for routine prenatal visit  Patient had symptoms of yeast infection spoke with RN was told to take Monistat started on Tuesday much better today  45 w 2d      Patient denies any LOF, VB with some CTX GFM    POCT urine today is 3+ protein and negative glucose  Storkpump form faxed

## 2020-09-24 NOTE — PATIENT INSTRUCTIONS
labo  Having Your Baby: The Labor Process   AMBULATORY CARE:   The labor process  is a series of 3 stages that your body goes through to deliver your baby  It is not known for sure what causes labor to begin  Hormones made by you and your baby and changes in your uterus may help labor to start  Labor usually starts 2 weeks before or after your due date  Most women do not have their baby exactly on their due date  First stage of labor: The first stage of labor includes early and active labor  Your uterus will contract to prepare your cervix for delivery and to push your baby out of the birth canal  Your cervix will dilate (widen) and efface (soften and become thinner)  Your contractions may last from 30 to 60 seconds  The contractions usually start in the back and move to the front  You may also have a pink, clear, or slightly bloody discharge called bloody show  Bloody show is caused by the movement of a mucus plug from your cervix  During pregnancy the mucus plug blocks your cervix to prevent it from opening  · Early labor  may last for several hours  You will most likely be at home during early labor  Rest as much as possible while you are at home  Have someone rub your back  It may be helpful to place ice packs on your lower back  Go for a short walk if you are able  Drink water and suck on ice chips  Ask your healthcare provider if you can eat during early labor  · Active labor  is when your contractions will get stronger, last longer, and happen more frequently  Your healthcare provider will tell you when to go to the hospital or birthing center  You may have chills or shakiness as you move from this stage to the second stage of labor  How you will know when you are in active labor: Your contractions become more frequent, last longer, and become more intense and painful  Time your contractions from the beginning of one to the beginning of the next  Write this information down for 1 hour   Your healthcare provider will tell you how many minutes apart the contractions should be before you need to go to the hospital or birthing center  Second stage of labor: Your cervix will be completely dilated to 10 centimeters and your baby will be ready to be born  The second stage of labor usually lasts 20 minutes to 2 hours  You may feel pressure on your rectum and the urge to push  This pressure is caused by the movement of your baby's head down the birth canal  Your healthcare provider will tell you when to push, and guide your baby out of the birth canal  After your baby is born, the umbilical cord will be cut  Third stage of labor:  The placenta (afterbirth) is delivered during this stage  After you give birth, your uterus will continue to contract to help push out the placenta  These contractions will begin 5 to 30 minutes after you give birth  Your healthcare provider will tell you when to push  You may have chills or shakiness during this stage  How to manage labor pain:  Pain can be managed naturally or with medicines  You can naturally manage pain by using relaxation methods and controlled breathing  There are different types of medicines that can be used to relieve pain while you are in labor  These medicines may be given through an IV or an epidural (thin catheter in your lower back)  Talk with your healthcare about your options for pain medicines if you choose to use them  Tell your provider if you prefer not to have any pain control medicines during labor  Seek care immediately if:   · You have vaginal spotting or bleeding  · Your water broke or you feel warm water gushing or trickling from your vagina  Contact your healthcare provider if:   · You have more than 5 contractions in 1 hour  · You have bloody mucus or show  · You notice any changes in your baby's movements  · You have abdominal cramps, pressure, or tightening  · You have a change in vaginal discharge      · You have questions or concerns about your condition or care  © 2017 [facility] Information is for End User's use only and may not be sold, redistributed or otherwise used for commercial purposes  All illustrations and images included in CareNotes® are the copyrighted property of [employer] D [employer] M [employer]  or Samuel Coronel  The above information is an  only  It is not intended as medical advice for individual conditions or treatments  Talk to your doctor, nurse or pharmacist before following any medical regimen to see if it is safe and effective for you

## 2020-09-29 ENCOUNTER — HOSPITAL ENCOUNTER (INPATIENT)
Facility: HOSPITAL | Age: 30
LOS: 5 days | Discharge: HOME | End: 2020-10-05
Attending: OBSTETRICS & GYNECOLOGY | Admitting: OBSTETRICS & GYNECOLOGY
Payer: COMMERCIAL

## 2020-09-29 ENCOUNTER — TELEPHONE (OUTPATIENT)
Dept: OTHER | Facility: OTHER | Age: 30
End: 2020-09-29

## 2020-09-29 DIAGNOSIS — Z3A.39 39 WEEKS GESTATION OF PREGNANCY: Primary | ICD-10-CM

## 2020-09-30 ENCOUNTER — ANESTHESIA (INPATIENT)
Dept: ANESTHESIOLOGY | Facility: HOSPITAL | Age: 30
End: 2020-09-30
Payer: COMMERCIAL

## 2020-09-30 ENCOUNTER — ANESTHESIA EVENT (INPATIENT)
Dept: ANESTHESIOLOGY | Facility: HOSPITAL | Age: 30
End: 2020-09-30
Payer: COMMERCIAL

## 2020-09-30 PROBLEM — Z3A.39 39 WEEKS GESTATION OF PREGNANCY: Status: ACTIVE | Noted: 2020-09-30

## 2020-09-30 LAB
ABO GROUP BLD: NORMAL
ALBUMIN SERPL BCP-MCNC: 2.8 G/DL (ref 3.5–5)
ALP SERPL-CCNC: 147 U/L (ref 46–116)
ALT SERPL W P-5'-P-CCNC: 12 U/L (ref 12–78)
ANION GAP SERPL CALCULATED.3IONS-SCNC: 13 MMOL/L (ref 4–13)
AST SERPL W P-5'-P-CCNC: 13 U/L (ref 5–45)
BACTERIA UR QL AUTO: ABNORMAL /HPF
BILIRUB SERPL-MCNC: 0.35 MG/DL (ref 0.2–1)
BILIRUB UR QL STRIP: NEGATIVE
BLD GP AB SCN SERPL QL: NEGATIVE
BUN SERPL-MCNC: 9 MG/DL (ref 5–25)
CALCIUM ALBUM COR SERPL-MCNC: 10.4 MG/DL (ref 8.3–10.1)
CALCIUM SERPL-MCNC: 9.4 MG/DL (ref 8.3–10.1)
CHLORIDE SERPL-SCNC: 104 MMOL/L (ref 100–108)
CLARITY UR: CLEAR
CO2 SERPL-SCNC: 20 MMOL/L (ref 21–32)
COLOR UR: YELLOW
CREAT SERPL-MCNC: 0.65 MG/DL (ref 0.6–1.3)
CREAT UR-MCNC: 53 MG/DL
CREAT UR-MCNC: 96 MG/DL
ERYTHROCYTE [DISTWIDTH] IN BLOOD BY AUTOMATED COUNT: 15.9 % (ref 11.6–15.1)
GFR SERPL CREATININE-BSD FRML MDRD: 120 ML/MIN/1.73SQ M
GLUCOSE SERPL-MCNC: 76 MG/DL (ref 65–140)
GLUCOSE UR STRIP-MCNC: NEGATIVE MG/DL
HCT VFR BLD AUTO: 37.8 % (ref 34.8–46.1)
HGB BLD-MCNC: 12.3 G/DL (ref 11.5–15.4)
HGB UR QL STRIP.AUTO: ABNORMAL
KETONES UR STRIP-MCNC: NEGATIVE MG/DL
LEUKOCYTE ESTERASE UR QL STRIP: NEGATIVE
MCH RBC QN AUTO: 28.1 PG (ref 26.8–34.3)
MCHC RBC AUTO-ENTMCNC: 32.5 G/DL (ref 31.4–37.4)
MCV RBC AUTO: 87 FL (ref 82–98)
NITRITE UR QL STRIP: NEGATIVE
NON-SQ EPI CELLS URNS QL MICRO: ABNORMAL /HPF
PH UR STRIP.AUTO: 6.5 [PH]
PLATELET # BLD AUTO: 162 THOUSANDS/UL (ref 149–390)
PMV BLD AUTO: 12.8 FL (ref 8.9–12.7)
POTASSIUM SERPL-SCNC: 3.9 MMOL/L (ref 3.5–5.3)
PROT SERPL-MCNC: 7.1 G/DL (ref 6.4–8.2)
PROT UR STRIP-MCNC: ABNORMAL MG/DL
PROT UR-MCNC: 139 MG/DL
PROT UR-MCNC: 95 MG/DL
PROT/CREAT UR: 0.99 MG/G{CREAT} (ref 0–0.1)
PROT/CREAT UR: 2.62 MG/G{CREAT} (ref 0–0.1)
RBC # BLD AUTO: 4.37 MILLION/UL (ref 3.81–5.12)
RBC #/AREA URNS AUTO: ABNORMAL /HPF
RH BLD: POSITIVE
RPR SER QL: NORMAL
SODIUM SERPL-SCNC: 137 MMOL/L (ref 136–145)
SP GR UR STRIP.AUTO: 1.01 (ref 1–1.03)
SPECIMEN EXPIRATION DATE: NORMAL
UROBILINOGEN UR QL STRIP.AUTO: 0.2 E.U./DL
WBC # BLD AUTO: 9.59 THOUSAND/UL (ref 4.31–10.16)
WBC #/AREA URNS AUTO: ABNORMAL /HPF

## 2020-09-30 PROCEDURE — 99214 OFFICE O/P EST MOD 30 MIN: CPT

## 2020-09-30 PROCEDURE — 86900 BLOOD TYPING SEROLOGIC ABO: CPT | Performed by: OBSTETRICS & GYNECOLOGY

## 2020-09-30 PROCEDURE — 86901 BLOOD TYPING SEROLOGIC RH(D): CPT | Performed by: OBSTETRICS & GYNECOLOGY

## 2020-09-30 PROCEDURE — 84156 ASSAY OF PROTEIN URINE: CPT | Performed by: OBSTETRICS & GYNECOLOGY

## 2020-09-30 PROCEDURE — NC001 PR NO CHARGE: Performed by: OBSTETRICS & GYNECOLOGY

## 2020-09-30 PROCEDURE — 81001 URINALYSIS AUTO W/SCOPE: CPT | Performed by: OBSTETRICS & GYNECOLOGY

## 2020-09-30 PROCEDURE — 4A1HXCZ MONITORING OF PRODUCTS OF CONCEPTION, CARDIAC RATE, EXTERNAL APPROACH: ICD-10-PCS | Performed by: STUDENT IN AN ORGANIZED HEALTH CARE EDUCATION/TRAINING PROGRAM

## 2020-09-30 PROCEDURE — 80053 COMPREHEN METABOLIC PANEL: CPT | Performed by: OBSTETRICS & GYNECOLOGY

## 2020-09-30 PROCEDURE — G0463 HOSPITAL OUTPT CLINIC VISIT: HCPCS

## 2020-09-30 PROCEDURE — 82570 ASSAY OF URINE CREATININE: CPT | Performed by: OBSTETRICS & GYNECOLOGY

## 2020-09-30 PROCEDURE — 86592 SYPHILIS TEST NON-TREP QUAL: CPT | Performed by: OBSTETRICS & GYNECOLOGY

## 2020-09-30 PROCEDURE — 86850 RBC ANTIBODY SCREEN: CPT | Performed by: OBSTETRICS & GYNECOLOGY

## 2020-09-30 PROCEDURE — 10H07YZ INSERTION OF OTHER DEVICE INTO PRODUCTS OF CONCEPTION, VIA NATURAL OR ARTIFICIAL OPENING: ICD-10-PCS | Performed by: STUDENT IN AN ORGANIZED HEALTH CARE EDUCATION/TRAINING PROGRAM

## 2020-09-30 PROCEDURE — 85027 COMPLETE CBC AUTOMATED: CPT | Performed by: OBSTETRICS & GYNECOLOGY

## 2020-09-30 RX ORDER — DIPHENHYDRAMINE HYDROCHLORIDE 50 MG/ML
25 INJECTION INTRAMUSCULAR; INTRAVENOUS EVERY 6 HOURS PRN
Status: DISCONTINUED | OUTPATIENT
Start: 2020-09-30 | End: 2020-10-01

## 2020-09-30 RX ORDER — BUPIVACAINE HYDROCHLORIDE 5 MG/ML
INJECTION, SOLUTION EPIDURAL; INTRACAUDAL AS NEEDED
Status: DISCONTINUED | OUTPATIENT
Start: 2020-09-30 | End: 2020-10-01 | Stop reason: HOSPADM

## 2020-09-30 RX ORDER — CALCIUM CARBONATE 200(500)MG
1000 TABLET,CHEWABLE ORAL DAILY PRN
Status: DISCONTINUED | OUTPATIENT
Start: 2020-09-30 | End: 2020-10-01

## 2020-09-30 RX ORDER — SODIUM CHLORIDE, SODIUM LACTATE, POTASSIUM CHLORIDE, CALCIUM CHLORIDE 600; 310; 30; 20 MG/100ML; MG/100ML; MG/100ML; MG/100ML
125 INJECTION, SOLUTION INTRAVENOUS CONTINUOUS
Status: DISCONTINUED | OUTPATIENT
Start: 2020-09-30 | End: 2020-10-01

## 2020-09-30 RX ORDER — BUPIVACAINE HYDROCHLORIDE 2.5 MG/ML
INJECTION, SOLUTION EPIDURAL; INFILTRATION; INTRACAUDAL AS NEEDED
Status: DISCONTINUED | OUTPATIENT
Start: 2020-09-30 | End: 2020-10-01 | Stop reason: HOSPADM

## 2020-09-30 RX ORDER — ONDANSETRON 2 MG/ML
4 INJECTION INTRAMUSCULAR; INTRAVENOUS EVERY 4 HOURS PRN
Status: DISCONTINUED | OUTPATIENT
Start: 2020-09-30 | End: 2020-10-01

## 2020-09-30 RX ORDER — OXYTOCIN/RINGER'S LACTATE 30/500 ML
1-30 PLASTIC BAG, INJECTION (ML) INTRAVENOUS
Status: DISCONTINUED | OUTPATIENT
Start: 2020-09-30 | End: 2020-10-01

## 2020-09-30 RX ORDER — BUTORPHANOL TARTRATE 1 MG/ML
1 INJECTION, SOLUTION INTRAMUSCULAR; INTRAVENOUS
Status: DISCONTINUED | OUTPATIENT
Start: 2020-09-30 | End: 2020-10-01

## 2020-09-30 RX ORDER — LIDOCAINE HYDROCHLORIDE AND EPINEPHRINE 15; 5 MG/ML; UG/ML
INJECTION, SOLUTION EPIDURAL
Status: COMPLETED | OUTPATIENT
Start: 2020-09-30 | End: 2020-09-30

## 2020-09-30 RX ORDER — ONDANSETRON 2 MG/ML
4 INJECTION INTRAMUSCULAR; INTRAVENOUS EVERY 8 HOURS PRN
Status: DISCONTINUED | OUTPATIENT
Start: 2020-09-30 | End: 2020-10-01

## 2020-09-30 RX ORDER — METOCLOPRAMIDE HYDROCHLORIDE 5 MG/ML
5 INJECTION INTRAMUSCULAR; INTRAVENOUS EVERY 6 HOURS PRN
Status: DISCONTINUED | OUTPATIENT
Start: 2020-09-30 | End: 2020-10-01

## 2020-09-30 RX ADMIN — ROPIVACAINE HYDROCHLORIDE: 2 INJECTION, SOLUTION EPIDURAL; INFILTRATION at 15:11

## 2020-09-30 RX ADMIN — SODIUM CHLORIDE, SODIUM LACTATE, POTASSIUM CHLORIDE, AND CALCIUM CHLORIDE 125 ML/HR: .6; .31; .03; .02 INJECTION, SOLUTION INTRAVENOUS at 01:48

## 2020-09-30 RX ADMIN — DIPHENHYDRAMINE HYDROCHLORIDE 25 MG: 50 INJECTION, SOLUTION INTRAMUSCULAR; INTRAVENOUS at 06:48

## 2020-09-30 RX ADMIN — ONDANSETRON 4 MG: 2 INJECTION INTRAMUSCULAR; INTRAVENOUS at 18:11

## 2020-09-30 RX ADMIN — Medication 2 MILLI-UNITS/MIN: at 04:07

## 2020-09-30 RX ADMIN — SODIUM CHLORIDE, SODIUM LACTATE, POTASSIUM CHLORIDE, AND CALCIUM CHLORIDE 125 ML/HR: .6; .31; .03; .02 INJECTION, SOLUTION INTRAVENOUS at 05:09

## 2020-09-30 RX ADMIN — SODIUM CHLORIDE, SODIUM LACTATE, POTASSIUM CHLORIDE, AND CALCIUM CHLORIDE 125 ML/HR: .6; .31; .03; .02 INJECTION, SOLUTION INTRAVENOUS at 20:48

## 2020-09-30 RX ADMIN — LIDOCAINE HYDROCHLORIDE AND EPINEPHRINE 5 ML: 15; 5 INJECTION, SOLUTION EPIDURAL at 08:38

## 2020-09-30 RX ADMIN — Medication 4 MILLI-UNITS/MIN: at 04:45

## 2020-09-30 RX ADMIN — BUPIVACAINE HYDROCHLORIDE 4 ML: 5 INJECTION, SOLUTION EPIDURAL; INTRACAUDAL at 22:22

## 2020-09-30 RX ADMIN — SODIUM CHLORIDE, SODIUM LACTATE, POTASSIUM CHLORIDE, AND CALCIUM CHLORIDE 525 ML/HR: .6; .31; .03; .02 INJECTION, SOLUTION INTRAVENOUS at 22:17

## 2020-09-30 RX ADMIN — BUTORPHANOL TARTRATE 1 MG: 1 INJECTION, SOLUTION INTRAMUSCULAR; INTRAVENOUS at 05:59

## 2020-09-30 RX ADMIN — ROPIVACAINE HYDROCHLORIDE: 2 INJECTION, SOLUTION EPIDURAL; INFILTRATION at 08:45

## 2020-09-30 RX ADMIN — BUPIVACAINE HYDROCHLORIDE 4 ML: 2.5 INJECTION, SOLUTION EPIDURAL; INFILTRATION; INTRACAUDAL at 22:22

## 2020-09-30 RX ADMIN — ROPIVACAINE HYDROCHLORIDE: 2 INJECTION, SOLUTION EPIDURAL; INFILTRATION at 21:01

## 2020-09-30 RX ADMIN — SODIUM CHLORIDE, SODIUM LACTATE, POTASSIUM CHLORIDE, AND CALCIUM CHLORIDE 125 ML/HR: .6; .31; .03; .02 INJECTION, SOLUTION INTRAVENOUS at 18:00

## 2020-09-30 RX ADMIN — SODIUM CHLORIDE, SODIUM LACTATE, POTASSIUM CHLORIDE, AND CALCIUM CHLORIDE 125 ML/HR: .6; .31; .03; .02 INJECTION, SOLUTION INTRAVENOUS at 13:16

## 2020-09-30 NOTE — TELEPHONE ENCOUNTER
[phone]/NEHAL Mendenhall/AGATA 1990/Patient is 39 weeks pregnant and stated her water broke  Patient would like to have a call back   Thank you

## 2020-10-01 LAB
ALBUMIN SERPL BCP-MCNC: 2.1 G/DL (ref 3.5–5)
ALP SERPL-CCNC: 138 U/L (ref 46–116)
ALT SERPL W P-5'-P-CCNC: 9 U/L (ref 12–78)
ANION GAP SERPL CALCULATED.3IONS-SCNC: 13 MMOL/L (ref 4–13)
AST SERPL W P-5'-P-CCNC: 21 U/L (ref 5–45)
BASE EXCESS BLDCOA CALC-SCNC: -8.8 MMOL/L (ref 3–11)
BASOPHILS # BLD AUTO: 0.06 THOUSANDS/ΜL (ref 0–0.1)
BASOPHILS NFR BLD AUTO: 0 % (ref 0–1)
BILIRUB SERPL-MCNC: 0.75 MG/DL (ref 0.2–1)
BUN SERPL-MCNC: 7 MG/DL (ref 5–25)
CALCIUM ALBUM COR SERPL-MCNC: 10.1 MG/DL (ref 8.3–10.1)
CALCIUM SERPL-MCNC: 8.6 MG/DL (ref 8.3–10.1)
CHLORIDE SERPL-SCNC: 104 MMOL/L (ref 100–108)
CO2 SERPL-SCNC: 18 MMOL/L (ref 21–32)
CREAT SERPL-MCNC: 1.05 MG/DL (ref 0.6–1.3)
EOSINOPHIL # BLD AUTO: 0 THOUSAND/ΜL (ref 0–0.61)
EOSINOPHIL NFR BLD AUTO: 0 % (ref 0–6)
ERYTHROCYTE [DISTWIDTH] IN BLOOD BY AUTOMATED COUNT: 15.9 % (ref 11.6–15.1)
GFR SERPL CREATININE-BSD FRML MDRD: 72 ML/MIN/1.73SQ M
GLUCOSE SERPL-MCNC: 111 MG/DL (ref 65–140)
HCO3 BLDCOA-SCNC: 16.1 MMOL/L (ref 17.3–27.3)
HCT VFR BLD AUTO: 37.9 % (ref 34.8–46.1)
HGB BLD-MCNC: 12.4 G/DL (ref 11.5–15.4)
IMM GRANULOCYTES # BLD AUTO: 0.14 THOUSAND/UL (ref 0–0.2)
IMM GRANULOCYTES NFR BLD AUTO: 1 % (ref 0–2)
LYMPHOCYTES # BLD AUTO: 1.16 THOUSANDS/ΜL (ref 0.6–4.47)
LYMPHOCYTES NFR BLD AUTO: 5 % (ref 14–44)
MCH RBC QN AUTO: 28.5 PG (ref 26.8–34.3)
MCHC RBC AUTO-ENTMCNC: 32.7 G/DL (ref 31.4–37.4)
MCV RBC AUTO: 87 FL (ref 82–98)
MONOCYTES # BLD AUTO: 1.74 THOUSAND/ΜL (ref 0.17–1.22)
MONOCYTES NFR BLD AUTO: 8 % (ref 4–12)
NEUTROPHILS # BLD AUTO: 19.8 THOUSANDS/ΜL (ref 1.85–7.62)
NEUTS SEG NFR BLD AUTO: 86 % (ref 43–75)
NRBC BLD AUTO-RTO: 0 /100 WBCS
O2 CT VFR BLDCOA CALC: 9.4 ML/DL
OXYHGB MFR BLDCOA: 40.8 %
PCO2 BLDCOA: 32.8 MM[HG] (ref 30–60)
PH BLDCOA: 7.31 [PH] (ref 7.23–7.43)
PLATELET # BLD AUTO: 148 THOUSANDS/UL (ref 149–390)
PMV BLD AUTO: 12.7 FL (ref 8.9–12.7)
PO2 BLDCOA: 21.1 MM HG (ref 5–25)
POTASSIUM SERPL-SCNC: 3 MMOL/L (ref 3.5–5.3)
PROT SERPL-MCNC: 6.1 G/DL (ref 6.4–8.2)
RBC # BLD AUTO: 4.35 MILLION/UL (ref 3.81–5.12)
SODIUM SERPL-SCNC: 135 MMOL/L (ref 136–145)
WBC # BLD AUTO: 22.9 THOUSAND/UL (ref 4.31–10.16)

## 2020-10-01 PROCEDURE — 85025 COMPLETE CBC W/AUTO DIFF WBC: CPT | Performed by: OBSTETRICS & GYNECOLOGY

## 2020-10-01 PROCEDURE — 88307 TISSUE EXAM BY PATHOLOGIST: CPT | Performed by: PATHOLOGY

## 2020-10-01 PROCEDURE — 0KQM0ZZ REPAIR PERINEUM MUSCLE, OPEN APPROACH: ICD-10-PCS | Performed by: STUDENT IN AN ORGANIZED HEALTH CARE EDUCATION/TRAINING PROGRAM

## 2020-10-01 PROCEDURE — 99024 POSTOP FOLLOW-UP VISIT: CPT | Performed by: OBSTETRICS & GYNECOLOGY

## 2020-10-01 PROCEDURE — 80053 COMPREHEN METABOLIC PANEL: CPT | Performed by: OBSTETRICS & GYNECOLOGY

## 2020-10-01 PROCEDURE — 82805 BLOOD GASES W/O2 SATURATION: CPT | Performed by: STUDENT IN AN ORGANIZED HEALTH CARE EDUCATION/TRAINING PROGRAM

## 2020-10-01 PROCEDURE — 59400 OBSTETRICAL CARE: CPT | Performed by: STUDENT IN AN ORGANIZED HEALTH CARE EDUCATION/TRAINING PROGRAM

## 2020-10-01 RX ORDER — ACETAMINOPHEN 325 MG/1
650 TABLET ORAL EVERY 6 HOURS PRN
Status: DISCONTINUED | OUTPATIENT
Start: 2020-10-01 | End: 2020-10-05 | Stop reason: HOSPADM

## 2020-10-01 RX ORDER — IBUPROFEN 600 MG/1
600 TABLET ORAL EVERY 6 HOURS PRN
Status: DISCONTINUED | OUTPATIENT
Start: 2020-10-01 | End: 2020-10-05 | Stop reason: HOSPADM

## 2020-10-01 RX ORDER — MAGNESIUM SULFATE HEPTAHYDRATE 40 MG/ML
2 INJECTION, SOLUTION INTRAVENOUS ONCE
Status: COMPLETED | OUTPATIENT
Start: 2020-10-01 | End: 2020-10-01

## 2020-10-01 RX ORDER — MAGNESIUM SULFATE HEPTAHYDRATE 40 MG/ML
2 INJECTION, SOLUTION INTRAVENOUS CONTINUOUS
Status: DISCONTINUED | OUTPATIENT
Start: 2020-10-01 | End: 2020-10-02

## 2020-10-01 RX ORDER — CALCIUM CARBONATE 200(500)MG
1000 TABLET,CHEWABLE ORAL 3 TIMES DAILY PRN
Status: DISCONTINUED | OUTPATIENT
Start: 2020-10-01 | End: 2020-10-05 | Stop reason: HOSPADM

## 2020-10-01 RX ORDER — ONDANSETRON 2 MG/ML
4 INJECTION INTRAMUSCULAR; INTRAVENOUS EVERY 8 HOURS PRN
Status: DISCONTINUED | OUTPATIENT
Start: 2020-10-01 | End: 2020-10-05 | Stop reason: HOSPADM

## 2020-10-01 RX ORDER — LABETALOL 20 MG/4 ML (5 MG/ML) INTRAVENOUS SYRINGE
20 ONCE
Status: COMPLETED | OUTPATIENT
Start: 2020-10-01 | End: 2020-10-01

## 2020-10-01 RX ORDER — DIAPER,BRIEF,INFANT-TODD,DISP
1 EACH MISCELLANEOUS 4 TIMES DAILY PRN
Status: DISCONTINUED | OUTPATIENT
Start: 2020-10-01 | End: 2020-10-05 | Stop reason: HOSPADM

## 2020-10-01 RX ORDER — MAGNESIUM SULFATE HEPTAHYDRATE 40 MG/ML
4 INJECTION, SOLUTION INTRAVENOUS CONTINUOUS
Status: DISCONTINUED | OUTPATIENT
Start: 2020-10-01 | End: 2020-10-01

## 2020-10-01 RX ORDER — MAGNESIUM SULFATE HEPTAHYDRATE 40 MG/ML
4 INJECTION, SOLUTION INTRAVENOUS ONCE
Status: COMPLETED | OUTPATIENT
Start: 2020-10-01 | End: 2020-10-01

## 2020-10-01 RX ORDER — DOCUSATE SODIUM 100 MG/1
100 CAPSULE, LIQUID FILLED ORAL 2 TIMES DAILY
Status: DISCONTINUED | OUTPATIENT
Start: 2020-10-01 | End: 2020-10-05 | Stop reason: HOSPADM

## 2020-10-01 RX ORDER — MAGNESIUM SULFATE HEPTAHYDRATE 40 MG/ML
2 INJECTION, SOLUTION INTRAVENOUS CONTINUOUS
Status: DISCONTINUED | OUTPATIENT
Start: 2020-10-01 | End: 2020-10-01

## 2020-10-01 RX ORDER — DIPHENHYDRAMINE HCL 25 MG
25 TABLET ORAL EVERY 6 HOURS PRN
Status: DISCONTINUED | OUTPATIENT
Start: 2020-10-01 | End: 2020-10-05 | Stop reason: HOSPADM

## 2020-10-01 RX ORDER — CARBOPROST TROMETHAMINE 250 UG/ML
INJECTION, SOLUTION INTRAMUSCULAR
Status: COMPLETED
Start: 2020-10-01 | End: 2020-10-01

## 2020-10-01 RX ORDER — METHYLERGONOVINE MALEATE 0.2 MG/ML
INJECTION INTRAVENOUS
Status: DISCONTINUED
Start: 2020-10-01 | End: 2020-10-01 | Stop reason: WASHOUT

## 2020-10-01 RX ORDER — SODIUM CHLORIDE, SODIUM LACTATE, POTASSIUM CHLORIDE, CALCIUM CHLORIDE 600; 310; 30; 20 MG/100ML; MG/100ML; MG/100ML; MG/100ML
50 INJECTION, SOLUTION INTRAVENOUS CONTINUOUS
Status: DISCONTINUED | OUTPATIENT
Start: 2020-10-01 | End: 2020-10-02

## 2020-10-01 RX ORDER — CARBOPROST TROMETHAMINE 250 UG/ML
250 INJECTION, SOLUTION INTRAMUSCULAR
Status: DISCONTINUED | OUTPATIENT
Start: 2020-10-01 | End: 2020-10-05 | Stop reason: HOSPADM

## 2020-10-01 RX ADMIN — BENZOCAINE AND LEVOMENTHOL: 200; 5 SPRAY TOPICAL at 08:51

## 2020-10-01 RX ADMIN — MAGNESIUM SULFATE IN WATER 2 G/HR: 40 INJECTION, SOLUTION INTRAVENOUS at 18:29

## 2020-10-01 RX ADMIN — ROPIVACAINE HYDROCHLORIDE: 2 INJECTION, SOLUTION EPIDURAL; INFILTRATION at 02:10

## 2020-10-01 RX ADMIN — CARBOPROST TROMETHAMINE 250 MCG: 250 INJECTION, SOLUTION INTRAMUSCULAR at 04:58

## 2020-10-01 RX ADMIN — GENTAMICIN SULFATE 270 MG: 40 INJECTION, SOLUTION INTRAMUSCULAR; INTRAVENOUS at 02:00

## 2020-10-01 RX ADMIN — AMPICILLIN SODIUM 2000 MG: 2 INJECTION, POWDER, FOR SOLUTION INTRAMUSCULAR; INTRAVENOUS at 11:03

## 2020-10-01 RX ADMIN — WITCH HAZEL 1 PAD: 500 SOLUTION RECTAL; TOPICAL at 08:50

## 2020-10-01 RX ADMIN — MAGNESIUM SULFATE IN WATER 2 G/HR: 40 INJECTION, SOLUTION INTRAVENOUS at 08:16

## 2020-10-01 RX ADMIN — ONDANSETRON 4 MG: 2 INJECTION INTRAMUSCULAR; INTRAVENOUS at 00:33

## 2020-10-01 RX ADMIN — LABETALOL 20 MG/4 ML (5 MG/ML) INTRAVENOUS SYRINGE 20 MG: at 07:02

## 2020-10-01 RX ADMIN — MAGNESIUM SULFATE IN WATER 2 G: 40 INJECTION, SOLUTION INTRAVENOUS at 07:58

## 2020-10-01 RX ADMIN — SODIUM CHLORIDE, SODIUM LACTATE, POTASSIUM CHLORIDE, AND CALCIUM CHLORIDE 50 ML/HR: .6; .31; .03; .02 INJECTION, SOLUTION INTRAVENOUS at 07:33

## 2020-10-01 RX ADMIN — HYDROCORTISONE 1 APPLICATION: 1 CREAM TOPICAL at 08:50

## 2020-10-01 RX ADMIN — AMPICILLIN SODIUM 2000 MG: 2 INJECTION, POWDER, FOR SOLUTION INTRAMUSCULAR; INTRAVENOUS at 19:13

## 2020-10-01 RX ADMIN — AMPICILLIN SODIUM 2000 MG: 2 INJECTION, POWDER, FOR SOLUTION INTRAMUSCULAR; INTRAVENOUS at 02:33

## 2020-10-01 RX ADMIN — SODIUM CHLORIDE, SODIUM LACTATE, POTASSIUM CHLORIDE, AND CALCIUM CHLORIDE 999 ML/HR: .6; .31; .03; .02 INJECTION, SOLUTION INTRAVENOUS at 01:52

## 2020-10-01 RX ADMIN — IBUPROFEN 600 MG: 600 TABLET, FILM COATED ORAL at 06:16

## 2020-10-01 RX ADMIN — MAGNESIUM SULFATE IN WATER 4 G: 40 INJECTION, SOLUTION INTRAVENOUS at 07:33

## 2020-10-02 PROCEDURE — 99024 POSTOP FOLLOW-UP VISIT: CPT | Performed by: STUDENT IN AN ORGANIZED HEALTH CARE EDUCATION/TRAINING PROGRAM

## 2020-10-02 RX ADMIN — MAGNESIUM SULFATE IN WATER 2 G/HR: 40 INJECTION, SOLUTION INTRAVENOUS at 04:22

## 2020-10-02 RX ADMIN — IBUPROFEN 600 MG: 600 TABLET, FILM COATED ORAL at 08:02

## 2020-10-02 RX ADMIN — DOCUSATE SODIUM 100 MG: 100 CAPSULE ORAL at 08:02

## 2020-10-02 RX ADMIN — SODIUM CHLORIDE, SODIUM LACTATE, POTASSIUM CHLORIDE, AND CALCIUM CHLORIDE 50 ML/HR: .6; .31; .03; .02 INJECTION, SOLUTION INTRAVENOUS at 00:56

## 2020-10-02 RX ADMIN — DOCUSATE SODIUM 100 MG: 100 CAPSULE ORAL at 17:52

## 2020-10-02 RX ADMIN — AMPICILLIN SODIUM 2000 MG: 2 INJECTION, POWDER, FOR SOLUTION INTRAMUSCULAR; INTRAVENOUS at 00:56

## 2020-10-03 PROCEDURE — 99024 POSTOP FOLLOW-UP VISIT: CPT | Performed by: OBSTETRICS & GYNECOLOGY

## 2020-10-03 RX ORDER — LABETALOL 200 MG/1
200 TABLET, FILM COATED ORAL EVERY 12 HOURS SCHEDULED
Status: DISCONTINUED | OUTPATIENT
Start: 2020-10-03 | End: 2020-10-04

## 2020-10-03 RX ADMIN — LABETALOL HYDROCHLORIDE 200 MG: 200 TABLET, FILM COATED ORAL at 18:23

## 2020-10-03 RX ADMIN — IBUPROFEN 600 MG: 600 TABLET, FILM COATED ORAL at 00:14

## 2020-10-03 RX ADMIN — DOCUSATE SODIUM 100 MG: 100 CAPSULE ORAL at 08:12

## 2020-10-03 RX ADMIN — DOCUSATE SODIUM 100 MG: 100 CAPSULE ORAL at 18:23

## 2020-10-04 LAB
ALBUMIN SERPL BCP-MCNC: 2.5 G/DL (ref 3.5–5)
ALP SERPL-CCNC: 164 U/L (ref 46–116)
ALT SERPL W P-5'-P-CCNC: 44 U/L (ref 12–78)
ANION GAP SERPL CALCULATED.3IONS-SCNC: 11 MMOL/L (ref 4–13)
AST SERPL W P-5'-P-CCNC: 61 U/L (ref 5–45)
BILIRUB SERPL-MCNC: 0.28 MG/DL (ref 0.2–1)
BUN SERPL-MCNC: 9 MG/DL (ref 5–25)
CALCIUM ALBUM COR SERPL-MCNC: 9.9 MG/DL (ref 8.3–10.1)
CALCIUM SERPL-MCNC: 8.7 MG/DL (ref 8.3–10.1)
CHLORIDE SERPL-SCNC: 109 MMOL/L (ref 100–108)
CO2 SERPL-SCNC: 23 MMOL/L (ref 21–32)
CREAT SERPL-MCNC: 0.7 MG/DL (ref 0.6–1.3)
GFR SERPL CREATININE-BSD FRML MDRD: 117 ML/MIN/1.73SQ M
GLUCOSE SERPL-MCNC: 81 MG/DL (ref 65–140)
POTASSIUM SERPL-SCNC: 4 MMOL/L (ref 3.5–5.3)
PROT SERPL-MCNC: 6.7 G/DL (ref 6.4–8.2)
SODIUM SERPL-SCNC: 143 MMOL/L (ref 136–145)

## 2020-10-04 PROCEDURE — 99024 POSTOP FOLLOW-UP VISIT: CPT | Performed by: STUDENT IN AN ORGANIZED HEALTH CARE EDUCATION/TRAINING PROGRAM

## 2020-10-04 PROCEDURE — 80053 COMPREHEN METABOLIC PANEL: CPT | Performed by: OBSTETRICS & GYNECOLOGY

## 2020-10-04 RX ORDER — NIFEDIPINE 10 MG/1
10 CAPSULE ORAL ONCE
Status: COMPLETED | OUTPATIENT
Start: 2020-10-04 | End: 2020-10-04

## 2020-10-04 RX ORDER — NIFEDIPINE 30 MG/1
30 TABLET, EXTENDED RELEASE ORAL DAILY
Status: DISCONTINUED | OUTPATIENT
Start: 2020-10-04 | End: 2020-10-04

## 2020-10-04 RX ORDER — HYDRALAZINE HYDROCHLORIDE 20 MG/ML
10 INJECTION INTRAMUSCULAR; INTRAVENOUS ONCE
Status: COMPLETED | OUTPATIENT
Start: 2020-10-04 | End: 2020-10-04

## 2020-10-04 RX ORDER — NIFEDIPINE 30 MG/1
30 TABLET, EXTENDED RELEASE ORAL DAILY
Status: DISCONTINUED | OUTPATIENT
Start: 2020-10-04 | End: 2020-10-05 | Stop reason: HOSPADM

## 2020-10-04 RX ADMIN — HYDRALAZINE HYDROCHLORIDE 10 MG: 20 INJECTION INTRAMUSCULAR; INTRAVENOUS at 11:52

## 2020-10-04 RX ADMIN — NIFEDIPINE 10 MG: 10 CAPSULE ORAL at 11:51

## 2020-10-04 RX ADMIN — DOCUSATE SODIUM 100 MG: 100 CAPSULE ORAL at 17:57

## 2020-10-04 RX ADMIN — DOCUSATE SODIUM 100 MG: 100 CAPSULE ORAL at 07:38

## 2020-10-04 RX ADMIN — LABETALOL HYDROCHLORIDE 200 MG: 200 TABLET, FILM COATED ORAL at 07:38

## 2020-10-04 RX ADMIN — ACETAMINOPHEN 650 MG: 325 TABLET, FILM COATED ORAL at 00:44

## 2020-10-04 RX ADMIN — NIFEDIPINE 30 MG: 30 TABLET, FILM COATED, EXTENDED RELEASE ORAL at 15:10

## 2020-10-04 RX ADMIN — IBUPROFEN 600 MG: 600 TABLET, FILM COATED ORAL at 15:10

## 2020-10-05 VITALS
DIASTOLIC BLOOD PRESSURE: 86 MMHG | RESPIRATION RATE: 18 BRPM | TEMPERATURE: 98.9 F | OXYGEN SATURATION: 98 % | HEIGHT: 64 IN | HEART RATE: 54 BPM | WEIGHT: 216.6 LBS | BODY MASS INDEX: 36.98 KG/M2 | SYSTOLIC BLOOD PRESSURE: 154 MMHG

## 2020-10-05 PROCEDURE — 99024 POSTOP FOLLOW-UP VISIT: CPT | Performed by: OBSTETRICS & GYNECOLOGY

## 2020-10-05 RX ORDER — DOCUSATE SODIUM 100 MG/1
100 CAPSULE, LIQUID FILLED ORAL 2 TIMES DAILY
Qty: 10 CAPSULE | Refills: 0
Start: 2020-10-05 | End: 2021-04-09 | Stop reason: ALTCHOICE

## 2020-10-05 RX ORDER — NIFEDIPINE 30 MG/1
30 TABLET, FILM COATED, EXTENDED RELEASE ORAL DAILY
Qty: 30 TABLET | Refills: 0 | Status: SHIPPED | OUTPATIENT
Start: 2020-10-05 | End: 2021-04-09 | Stop reason: ALTCHOICE

## 2020-10-05 RX ORDER — IBUPROFEN 600 MG/1
600 TABLET ORAL EVERY 6 HOURS PRN
Qty: 30 TABLET | Refills: 0
Start: 2020-10-05 | End: 2021-04-09 | Stop reason: ALTCHOICE

## 2020-10-05 RX ORDER — ACETAMINOPHEN 325 MG/1
650 TABLET ORAL EVERY 6 HOURS PRN
Refills: 0
Start: 2020-10-05 | End: 2021-04-09 | Stop reason: ALTCHOICE

## 2020-10-05 RX ORDER — DOCUSATE SODIUM 100 MG/1
100 CAPSULE, LIQUID FILLED ORAL 2 TIMES DAILY
Qty: 10 CAPSULE | Refills: 0 | Status: CANCELLED | OUTPATIENT
Start: 2020-10-05

## 2020-10-05 RX ADMIN — DOCUSATE SODIUM 100 MG: 100 CAPSULE ORAL at 09:06

## 2020-10-05 RX ADMIN — NIFEDIPINE 30 MG: 30 TABLET, FILM COATED, EXTENDED RELEASE ORAL at 09:06

## 2020-10-08 ENCOUNTER — POSTPARTUM VISIT (OUTPATIENT)
Dept: OBGYN CLINIC | Facility: CLINIC | Age: 30
End: 2020-10-08

## 2020-10-08 VITALS
DIASTOLIC BLOOD PRESSURE: 92 MMHG | TEMPERATURE: 97.5 F | WEIGHT: 192.4 LBS | BODY MASS INDEX: 33.03 KG/M2 | SYSTOLIC BLOOD PRESSURE: 138 MMHG

## 2020-10-08 DIAGNOSIS — Z01.30 BP CHECK: Primary | ICD-10-CM

## 2020-10-08 PROCEDURE — 99024 POSTOP FOLLOW-UP VISIT: CPT | Performed by: NURSE PRACTITIONER

## 2020-10-22 ENCOUNTER — POSTPARTUM VISIT (OUTPATIENT)
Dept: OBGYN CLINIC | Age: 30
End: 2020-10-22

## 2020-10-22 VITALS
WEIGHT: 158 LBS | BODY MASS INDEX: 26.98 KG/M2 | RESPIRATION RATE: 14 BRPM | HEIGHT: 64 IN | SYSTOLIC BLOOD PRESSURE: 120 MMHG | DIASTOLIC BLOOD PRESSURE: 80 MMHG

## 2020-10-22 PROBLEM — O99.019 ANTEPARTUM ANEMIA: Status: RESOLVED | Noted: 2020-07-17 | Resolved: 2020-10-22

## 2020-10-22 PROBLEM — Z34.03 ENCOUNTER FOR SUPERVISION OF NORMAL FIRST PREGNANCY IN THIRD TRIMESTER: Status: RESOLVED | Noted: 2020-09-14 | Resolved: 2020-10-22

## 2020-10-22 PROBLEM — Z3A.39 39 WEEKS GESTATION OF PREGNANCY: Status: RESOLVED | Noted: 2020-09-30 | Resolved: 2020-10-22

## 2020-10-22 PROBLEM — O12.03 SWELLING OF LOWER EXTREMITY DURING PREGNANCY IN THIRD TRIMESTER: Status: RESOLVED | Noted: 2020-07-31 | Resolved: 2020-10-22

## 2020-10-22 PROBLEM — Z3A.36 36 WEEKS GESTATION OF PREGNANCY: Status: RESOLVED | Noted: 2020-08-11 | Resolved: 2020-10-22

## 2020-10-22 PROBLEM — O99.213 OBESITY AFFECTING PREGNANCY IN THIRD TRIMESTER: Status: RESOLVED | Noted: 2020-05-25 | Resolved: 2020-10-22

## 2020-10-22 PROBLEM — Z3A.38 38 WEEKS GESTATION OF PREGNANCY: Status: RESOLVED | Noted: 2020-09-24 | Resolved: 2020-10-22

## 2020-10-22 PROBLEM — Z23 NEED FOR TDAP VACCINATION: Status: RESOLVED | Noted: 2020-07-17 | Resolved: 2020-10-22

## 2020-10-22 PROCEDURE — 99024 POSTOP FOLLOW-UP VISIT: CPT | Performed by: NURSE PRACTITIONER

## 2021-04-08 NOTE — PROGRESS NOTES
Diagnoses and all orders for this visit:    Encounter for annual routine gynecological examination    Pap collected today, discussed new guidelines  Healthy eating and nutrition, daily exercise discussed and SBE reinforced  Call with any issues and all questions and concerns addressed  All questions and concerns answered  Patient to call with any questions  Pleasant 28 y o  premenopausal female here for annual exam  , vaginal delivery 6 months ago  Doing very well as a new mom! Beautiful baby girl! She denies any issues with bleeding or her menses  Reports regular cycles  Denies history of abnormal pap smears  Denies vaginal, urinary or breast issues, today  Denies pelvic pain  No hormonal contraceptive use  Sexually active without any concerns and pt declines STD testing  Denies F/C/N/V  Health Maintenance:  Last PAP: 3/26/2020  Next PAP Due:       Past Medical History:   Diagnosis Date    Hiatal hernia with GERD     Varicella     vaccine    Visual impairment     eyewear/contacts/eyeglasses     Past Surgical History:   Procedure Laterality Date    CHOLECYSTECTOMY  2013    WISDOM TOOTH EXTRACTION       Family History   Problem Relation Age of Onset    Hypertension Mother     Hyperlipidemia Father     No Known Problems Brother     Heart disease Maternal Grandmother     Hypertension Maternal Grandmother     No Known Problems Maternal Grandfather     Hypertension Paternal Grandmother      Social History     Tobacco Use    Smoking status: Never Smoker    Smokeless tobacco: Never Used   Substance Use Topics    Alcohol use: Not Currently     Frequency: Monthly or less     Comment: socially     Drug use: Never     No current outpatient medications on file    Patient Active Problem List    Diagnosis Date Noted     (spontaneous vaginal delivery) 10/01/2020    Hiatal hernia 2020    Dizzy 2020       Allergies   Allergen Reactions    Latex Hives     Gloves and condoms     Other Hives     Scented products    Oxycodone-Acetaminophen Hives and Itching     Said she was told percocet but was IV pain medication        OB History    Para Term  AB Living   1 1 1     1   SAB TAB Ectopic Multiple Live Births         0 1      # Outcome Date GA Lbr Jesus Manuel/2nd Weight Sex Delivery Anes PTL Lv   1 Term 10/01/20 39w2d  3385 g (7 lb 7 4 oz) F Vag-Spont EPI N LUZ       Vitals:    21 0821   BP: 118/72   BP Location: Right arm   Patient Position: Sitting   Cuff Size: Standard   Weight: 92 2 kg (203 lb 3 2 oz)   Height: 5' 4" (1 626 m)     Body mass index is 34 88 kg/m²  Review of Systems   Constitutional: Negative for chills, fatigue, fever and unexpected weight change  Respiratory: Negative for shortness of breath  Gastrointestinal: Negative for anal bleeding, blood in stool, constipation and diarrhea  Genitourinary: Negative for difficulty urinating, dysuria and hematuria  OBGyn Exam    Physical Exam   Constitutional: She appears well-developed and well-nourished  No distress  Head: Normocephalic  Neck: Normal range of motion  Neck supple  Pulmonary: Effort normal   Breasts: bilateral without masses, skin changes or nipple discharge  Bilaterally soft and warm to touch  No areas of erythema or pain  Abdominal: Soft  Non-tender, obese  Pelvic exam was performed with patient supine  No labial fusion  There is no rash, tenderness, lesion or injury on the right labia  There is no rash, tenderness, lesion or injury on the left labia  Uterus is not deviated, not enlarged, not fixed and not tender  Cervix exhibits no motion tenderness, no discharge and no friability  Right adnexum displays no mass, no tenderness and no fullness  Left adnexum displays no mass, no tenderness and no fullness  No erythema or tenderness in the vagina  No foreign body in the vagina  No signs of injury around the vagina  No vaginal discharge found     Lymphadenopathy: Right: No inguinal adenopathy present          Left: No inguinal adenopathy present

## 2021-04-08 NOTE — PATIENT INSTRUCTIONS
Shopping for a Healthy Diet   WHAT YOU NEED TO KNOW:   Why is shopping for healthy foods important? You may be more likely to follow a healthy meal plan if you have healthy foods available in your home  A healthy meal plan includes a variety of healthy foods from all the food groups  It is also low in unhealthy fats, salt, and added sugar  Healthy foods may decrease your risk for heart disease, osteoporosis (brittle bones), and some types of cancer  What is a healthy meal plan? My Plate is a model for planning healthy meals  It shows the types and amounts of foods that should go on your plate  Fruits and vegetables make up about half of your plate, and grains and protein make up the other half  A serving of dairy is also included  The amount of calories and serving sizes you need depends on your age, gender, weight, and height  Examples of healthy foods are listed below:  · Eat a variety of vegetables  such as dark green, red, and orange vegetables  You can also include canned vegetables low in sodium (salt) and frozen vegetables without added butter or sauces  · Eat a variety of fresh fruits , canned fruit in 100% juice, frozen fruit, and dried fruit  · Include whole grains  At least half of the grains you eat should be whole grains  Examples include whole wheat bread, wheat pasta, brown rice, and whole grain cereals such as oatmeal     · Eat a variety of protein foods such as seafood (fish and shellfish), lean meat, and poultry without skin (turkey and chicken)  Examples of lean meats include pork leg, shoulder, or tenderloin, and beef round, sirloin, tenderloin, and extra lean ground beef  Other protein foods include eggs and egg substitutes, beans, peas, soy products, nuts, and seeds  · Choose low-fat dairy products such as skim or 1% milk or low-fat yogurt, cheese, and cottage cheese  What should I do before I go shopping? · Plan your meals        ? Plan your shopping around healthy meals and recipes that you will prepare for the week  Switch ingredients in recipes to lower the total fat and calorie content  For example, if a recipe calls for milk, you can add nonfat or 1% milk instead of whole milk  Use egg substitute in a recipe that calls for whole eggs  Make a list of the foods you will need for your planned meals  ? Plan healthy meals that can be made quickly on days when you are extra busy  Another idea is to plan meals that you can make in large batches and freeze  You can thaw and eat these meals on days when you do not have time to cook  For example, spaghetti can be made in several batches at once and frozen  · Find ways to make affordable healthy choices  Look through [employer] ads for sales  Buy fruits and vegetables when they are in season or buy them at your local farmer's market  · Eat before you go shopping  Shopping on an empty stomach may cause you to buy unhealthy foods because you feel hungry  What are some tips for making healthy choices while I am shopping? · Know where to find healthy foods in the grocery store  Go to the food sections that are found along the walls of the store first  Healthy foods, such as fruits, vegetables, bread, dairy products, meat, and fish, are usually placed in these areas  The inner aisles have other healthy foods, such as canned and frozen fruits and vegetables, and cereals  However, there are also other less healthy foods in the inner aisles  Some of these foods include packaged foods, snack foods, and desserts  Shopping in the inner aisles last may help you buy fewer of these foods  · Read food labels  Use the nutrition information on food labels to help you make healthier food choices when you shop  ? The serving size  is usually listed in cups or pieces and may include a weight (grams, ounces)  It also shows the number of servings that are in a package  Compare the amount that you will eat to the serving size listed  If the package contains 2 servings and you eat the whole package, then you will eat twice the amount of calories and nutrients listed  ? The nutrients  listed on the top section of the label are fat, saturated fat, trans fat, cholesterol, and sodium  Limit these nutrients because eating too much may increase your risk of certain diseases  The total carbohydrates, fiber, and sugars are also listed  The nutrients you should try to get enough of include dietary fiber, vitamin A, vitamin C, calcium, and iron  These nutrients may help you to reduce your risk of diseases such as osteoporosis and heart disease  ? The % daily value  (DV) listed on the food label next to the nutrients tells you if a food is low or high in these nutrients  A percent DV of 5% or less means that the food is low in that nutrient  A percent DV of 20% or more means that the food is high in that nutrient  · Keep food safety in mind  Keep raw, cooked, and ready-to-eat foods separate in your shopping cart  Place raw seafood, meat, and poultry in plastic bags to prevent leakage of juices to other foods  Foods that need to be refrigerated or frozen should be taken home and stored within 2 hours  This prevents the growth of bacteria that can lead to food poisoning  CARE AGREEMENT:   You have the right to help plan your care  Discuss treatment options with your healthcare provider to decide what care you want to receive  You always have the right to refuse treatment  The above information is an  only  It is not intended as medical advice for individual conditions or treatments  Talk to your doctor, nurse or pharmacist before following any medical regimen to see if it is safe and effective for you  © Copyright [address] Information is for End User's use only and may not be sold, redistributed or otherwise used for commercial purposes   All illustrations and images included in CareNotes® are the copyrighted property of A  [employer] M [employer]  or [address] [place] [address]

## 2021-04-09 ENCOUNTER — ANNUAL EXAM (OUTPATIENT)
Dept: OBGYN CLINIC | Facility: CLINIC | Age: 31
End: 2021-04-09
Payer: COMMERCIAL

## 2021-04-09 VITALS
DIASTOLIC BLOOD PRESSURE: 72 MMHG | WEIGHT: 203.2 LBS | BODY MASS INDEX: 34.69 KG/M2 | SYSTOLIC BLOOD PRESSURE: 118 MMHG | HEIGHT: 64 IN

## 2021-04-09 DIAGNOSIS — Z01.419 ENCOUNTER FOR ANNUAL ROUTINE GYNECOLOGICAL EXAMINATION: Primary | ICD-10-CM

## 2021-04-09 PROCEDURE — S0612 ANNUAL GYNECOLOGICAL EXAMINA: HCPCS | Performed by: NURSE PRACTITIONER

## 2021-04-13 DIAGNOSIS — Z23 ENCOUNTER FOR IMMUNIZATION: ICD-10-CM
